# Patient Record
Sex: MALE | Race: WHITE | Employment: FULL TIME | URBAN - METROPOLITAN AREA
[De-identification: names, ages, dates, MRNs, and addresses within clinical notes are randomized per-mention and may not be internally consistent; named-entity substitution may affect disease eponyms.]

---

## 2019-02-11 ENCOUNTER — APPOINTMENT (OUTPATIENT)
Dept: CT IMAGING | Facility: CLINIC | Age: 54
End: 2019-02-11
Payer: COMMERCIAL

## 2019-02-11 ENCOUNTER — HOSPITAL ENCOUNTER (EMERGENCY)
Facility: CLINIC | Age: 54
Discharge: HOME | End: 2019-02-11
Attending: EMERGENCY MEDICINE
Payer: COMMERCIAL

## 2019-02-11 VITALS
HEART RATE: 73 BPM | TEMPERATURE: 98 F | RESPIRATION RATE: 17 BRPM | BODY MASS INDEX: 22.46 KG/M2 | HEIGHT: 74 IN | OXYGEN SATURATION: 99 % | SYSTOLIC BLOOD PRESSURE: 175 MMHG | DIASTOLIC BLOOD PRESSURE: 120 MMHG | WEIGHT: 175 LBS

## 2019-02-11 DIAGNOSIS — Z76.0 ENCOUNTER FOR MEDICATION REFILL: ICD-10-CM

## 2019-02-11 DIAGNOSIS — K11.5 SIALOLITHIASIS OF SUBMANDIBULAR GLAND: Primary | ICD-10-CM

## 2019-02-11 LAB
ABSOLUTE EOS #: 0.2 K/UL (ref 0–0.4)
ABSOLUTE IMMATURE GRANULOCYTE: ABNORMAL K/UL (ref 0–0.3)
ABSOLUTE LYMPH #: 2.3 K/UL (ref 1–4.8)
ABSOLUTE MONO #: 0.8 K/UL (ref 0.1–1.2)
ALBUMIN SERPL-MCNC: 4.2 G/DL (ref 3.5–5.2)
ALBUMIN/GLOBULIN RATIO: 1.4 (ref 1–2.5)
ALP BLD-CCNC: 89 U/L (ref 40–129)
ALT SERPL-CCNC: 162 U/L (ref 5–41)
ANION GAP SERPL CALCULATED.3IONS-SCNC: 11 MMOL/L (ref 9–17)
AST SERPL-CCNC: 117 U/L
BASOPHILS # BLD: 1 % (ref 0–2)
BASOPHILS ABSOLUTE: 0.1 K/UL (ref 0–0.2)
BILIRUB SERPL-MCNC: 1.2 MG/DL (ref 0.3–1.2)
BUN BLDV-MCNC: 20 MG/DL (ref 6–20)
BUN/CREAT BLD: ABNORMAL (ref 9–20)
CALCIUM SERPL-MCNC: 9.6 MG/DL (ref 8.6–10.4)
CHLORIDE BLD-SCNC: 104 MMOL/L (ref 98–107)
CO2: 25 MMOL/L (ref 20–31)
CREAT SERPL-MCNC: 1.1 MG/DL (ref 0.7–1.2)
DIFFERENTIAL TYPE: ABNORMAL
EOSINOPHILS RELATIVE PERCENT: 2 % (ref 1–4)
GFR AFRICAN AMERICAN: >60 ML/MIN
GFR NON-AFRICAN AMERICAN: >60 ML/MIN
GFR SERPL CREATININE-BSD FRML MDRD: ABNORMAL ML/MIN/{1.73_M2}
GFR SERPL CREATININE-BSD FRML MDRD: ABNORMAL ML/MIN/{1.73_M2}
GLUCOSE BLD-MCNC: 122 MG/DL (ref 70–99)
HCT VFR BLD CALC: 44 % (ref 41–53)
HEMOGLOBIN: 14.5 G/DL (ref 13.5–17.5)
IMMATURE GRANULOCYTES: ABNORMAL %
LYMPHOCYTES # BLD: 23 % (ref 24–44)
MCH RBC QN AUTO: 31.1 PG (ref 26–34)
MCHC RBC AUTO-ENTMCNC: 32.9 G/DL (ref 31–37)
MCV RBC AUTO: 94.6 FL (ref 80–100)
MONOCYTES # BLD: 8 % (ref 2–11)
NRBC AUTOMATED: ABNORMAL PER 100 WBC
PDW BLD-RTO: 15.4 % (ref 12.5–15.4)
PLATELET # BLD: 292 K/UL (ref 140–450)
PLATELET ESTIMATE: ABNORMAL
PMV BLD AUTO: 7.5 FL (ref 6–12)
POTASSIUM SERPL-SCNC: 5.2 MMOL/L (ref 3.7–5.3)
RBC # BLD: 4.65 M/UL (ref 4.5–5.9)
RBC # BLD: ABNORMAL 10*6/UL
SEG NEUTROPHILS: 66 % (ref 36–66)
SEGMENTED NEUTROPHILS ABSOLUTE COUNT: 6.6 K/UL (ref 1.8–7.7)
SODIUM BLD-SCNC: 140 MMOL/L (ref 135–144)
TOTAL PROTEIN: 7.3 G/DL (ref 6.4–8.3)
WBC # BLD: 9.9 K/UL (ref 3.5–11)
WBC # BLD: ABNORMAL 10*3/UL

## 2019-02-11 PROCEDURE — 2580000003 HC RX 258: Performed by: EMERGENCY MEDICINE

## 2019-02-11 PROCEDURE — 85025 COMPLETE CBC W/AUTO DIFF WBC: CPT

## 2019-02-11 PROCEDURE — 96374 THER/PROPH/DIAG INJ IV PUSH: CPT

## 2019-02-11 PROCEDURE — 70491 CT SOFT TISSUE NECK W/DYE: CPT

## 2019-02-11 PROCEDURE — 99284 EMERGENCY DEPT VISIT MOD MDM: CPT

## 2019-02-11 PROCEDURE — 6360000002 HC RX W HCPCS: Performed by: EMERGENCY MEDICINE

## 2019-02-11 PROCEDURE — 36415 COLL VENOUS BLD VENIPUNCTURE: CPT

## 2019-02-11 PROCEDURE — 80053 COMPREHEN METABOLIC PANEL: CPT

## 2019-02-11 PROCEDURE — 6360000004 HC RX CONTRAST MEDICATION: Performed by: EMERGENCY MEDICINE

## 2019-02-11 RX ORDER — LISINOPRIL 10 MG/1
10 TABLET ORAL DAILY
Qty: 30 TABLET | Refills: 0 | Status: SHIPPED | OUTPATIENT
Start: 2019-02-11 | End: 2019-04-04

## 2019-02-11 RX ORDER — 0.9 % SODIUM CHLORIDE 0.9 %
80 INTRAVENOUS SOLUTION INTRAVENOUS ONCE
Status: DISCONTINUED | OUTPATIENT
Start: 2019-02-11 | End: 2019-02-11 | Stop reason: HOSPADM

## 2019-02-11 RX ORDER — KETOROLAC TROMETHAMINE 15 MG/ML
15 INJECTION, SOLUTION INTRAMUSCULAR; INTRAVENOUS ONCE
Status: COMPLETED | OUTPATIENT
Start: 2019-02-11 | End: 2019-02-11

## 2019-02-11 RX ORDER — MORPHINE SULFATE 4 MG/ML
4 INJECTION, SOLUTION INTRAMUSCULAR; INTRAVENOUS ONCE
Status: DISCONTINUED | OUTPATIENT
Start: 2019-02-11 | End: 2019-02-11

## 2019-02-11 RX ORDER — SODIUM CHLORIDE 0.9 % (FLUSH) 0.9 %
10 SYRINGE (ML) INJECTION 2 TIMES DAILY
Status: DISCONTINUED | OUTPATIENT
Start: 2019-02-11 | End: 2019-02-11 | Stop reason: HOSPADM

## 2019-02-11 RX ORDER — PANTOPRAZOLE SODIUM 40 MG/1
40 TABLET, DELAYED RELEASE ORAL DAILY
COMMUNITY

## 2019-02-11 RX ORDER — 0.9 % SODIUM CHLORIDE 0.9 %
500 INTRAVENOUS SOLUTION INTRAVENOUS ONCE
Status: COMPLETED | OUTPATIENT
Start: 2019-02-11 | End: 2019-02-11

## 2019-02-11 RX ADMIN — Medication 10 ML: at 16:09

## 2019-02-11 RX ADMIN — SODIUM CHLORIDE 500 ML: 9 INJECTION, SOLUTION INTRAVENOUS at 15:15

## 2019-02-11 RX ADMIN — KETOROLAC TROMETHAMINE 15 MG: 15 INJECTION, SOLUTION INTRAMUSCULAR; INTRAVENOUS at 16:22

## 2019-02-11 RX ADMIN — IOVERSOL 70 ML: 741 INJECTION INTRA-ARTERIAL; INTRAVENOUS at 16:08

## 2019-02-11 ASSESSMENT — PAIN DESCRIPTION - PAIN TYPE
TYPE: ACUTE PAIN

## 2019-02-11 ASSESSMENT — PAIN DESCRIPTION - FREQUENCY
FREQUENCY: CONTINUOUS
FREQUENCY: CONTINUOUS

## 2019-02-11 ASSESSMENT — PAIN SCALES - GENERAL
PAINLEVEL_OUTOF10: 6
PAINLEVEL_OUTOF10: 6
PAINLEVEL_OUTOF10: 8
PAINLEVEL_OUTOF10: 7

## 2019-02-11 ASSESSMENT — PAIN DESCRIPTION - DESCRIPTORS: DESCRIPTORS: SHARP

## 2019-02-11 ASSESSMENT — PAIN DESCRIPTION - ORIENTATION: ORIENTATION: RIGHT;LEFT

## 2019-02-11 ASSESSMENT — PAIN DESCRIPTION - LOCATION
LOCATION: THROAT
LOCATION: THROAT;NECK

## 2019-02-11 ASSESSMENT — PAIN - FUNCTIONAL ASSESSMENT: PAIN_FUNCTIONAL_ASSESSMENT: 0-10

## 2019-04-04 ENCOUNTER — HOSPITAL ENCOUNTER (INPATIENT)
Age: 54
LOS: 1 days | Discharge: HOME | DRG: 918 | End: 2019-04-04
Attending: EMERGENCY MEDICINE | Admitting: INTERNAL MEDICINE
Payer: COMMERCIAL

## 2019-04-04 VITALS
BODY MASS INDEX: 22.46 KG/M2 | WEIGHT: 175 LBS | RESPIRATION RATE: 13 BRPM | HEART RATE: 79 BPM | TEMPERATURE: 98.1 F | HEIGHT: 74 IN | DIASTOLIC BLOOD PRESSURE: 75 MMHG | SYSTOLIC BLOOD PRESSURE: 131 MMHG | OXYGEN SATURATION: 92 %

## 2019-04-04 DIAGNOSIS — T40.601A OPIATE OVERDOSE, ACCIDENTAL OR UNINTENTIONAL, INITIAL ENCOUNTER (HCC): Primary | ICD-10-CM

## 2019-04-04 PROBLEM — Z72.0 TOBACCO ABUSE: Status: ACTIVE | Noted: 2019-04-04

## 2019-04-04 PROBLEM — R73.9 HYPERGLYCEMIA: Status: ACTIVE | Noted: 2019-04-04

## 2019-04-04 PROBLEM — E87.6 HYPOKALEMIA: Status: ACTIVE | Noted: 2019-04-04

## 2019-04-04 PROBLEM — I10 UNCONTROLLED HYPERTENSION: Status: ACTIVE | Noted: 2019-04-04

## 2019-04-04 LAB
ABSOLUTE EOS #: 0.2 K/UL (ref 0–0.4)
ABSOLUTE IMMATURE GRANULOCYTE: ABNORMAL K/UL (ref 0–0.3)
ABSOLUTE LYMPH #: 2.1 K/UL (ref 1–4.8)
ABSOLUTE MONO #: 0.9 K/UL (ref 0.1–1.2)
ANION GAP SERPL CALCULATED.3IONS-SCNC: 15 MMOL/L (ref 9–17)
BASOPHILS # BLD: 1 % (ref 0–2)
BASOPHILS ABSOLUTE: 0.1 K/UL (ref 0–0.2)
BUN BLDV-MCNC: 18 MG/DL (ref 6–20)
BUN/CREAT BLD: ABNORMAL (ref 9–20)
CALCIUM SERPL-MCNC: 8.9 MG/DL (ref 8.6–10.4)
CHLORIDE BLD-SCNC: 104 MMOL/L (ref 98–107)
CO2: 20 MMOL/L (ref 20–31)
CREAT SERPL-MCNC: 1.2 MG/DL (ref 0.7–1.2)
DIFFERENTIAL TYPE: ABNORMAL
EOSINOPHILS RELATIVE PERCENT: 3 % (ref 1–4)
ESTIMATED AVERAGE GLUCOSE: 97 MG/DL
GFR AFRICAN AMERICAN: >60 ML/MIN
GFR NON-AFRICAN AMERICAN: >60 ML/MIN
GFR SERPL CREATININE-BSD FRML MDRD: ABNORMAL ML/MIN/{1.73_M2}
GFR SERPL CREATININE-BSD FRML MDRD: ABNORMAL ML/MIN/{1.73_M2}
GLUCOSE BLD-MCNC: 165 MG/DL (ref 70–99)
HBA1C MFR BLD: 5 % (ref 4–6)
HCT VFR BLD CALC: 40.6 % (ref 41–53)
HEMOGLOBIN: 13.6 G/DL (ref 13.5–17.5)
IMMATURE GRANULOCYTES: ABNORMAL %
LYMPHOCYTES # BLD: 26 % (ref 24–44)
MCH RBC QN AUTO: 32.4 PG (ref 26–34)
MCHC RBC AUTO-ENTMCNC: 33.5 G/DL (ref 31–37)
MCV RBC AUTO: 96.9 FL (ref 80–100)
MONOCYTES # BLD: 11 % (ref 2–11)
NRBC AUTOMATED: ABNORMAL PER 100 WBC
PDW BLD-RTO: 14.6 % (ref 12.5–15.4)
PLATELET # BLD: 249 K/UL (ref 140–450)
PLATELET ESTIMATE: ABNORMAL
PMV BLD AUTO: 7.8 FL (ref 6–12)
POTASSIUM SERPL-SCNC: 3.6 MMOL/L (ref 3.7–5.3)
RBC # BLD: 4.19 M/UL (ref 4.5–5.9)
RBC # BLD: ABNORMAL 10*6/UL
SEG NEUTROPHILS: 59 % (ref 36–66)
SEGMENTED NEUTROPHILS ABSOLUTE COUNT: 4.8 K/UL (ref 1.8–7.7)
SODIUM BLD-SCNC: 139 MMOL/L (ref 135–144)
TROPONIN INTERP: NORMAL
TROPONIN T: NORMAL NG/ML
TROPONIN, HIGH SENSITIVITY: 9 NG/L (ref 0–22)
WBC # BLD: 8.1 K/UL (ref 3.5–11)
WBC # BLD: ABNORMAL 10*3/UL

## 2019-04-04 PROCEDURE — 6360000002 HC RX W HCPCS: Performed by: INTERNAL MEDICINE

## 2019-04-04 PROCEDURE — 96374 THER/PROPH/DIAG INJ IV PUSH: CPT

## 2019-04-04 PROCEDURE — 2060000000 HC ICU INTERMEDIATE R&B

## 2019-04-04 PROCEDURE — 93005 ELECTROCARDIOGRAM TRACING: CPT

## 2019-04-04 PROCEDURE — 94761 N-INVAS EAR/PLS OXIMETRY MLT: CPT

## 2019-04-04 PROCEDURE — 80048 BASIC METABOLIC PNL TOTAL CA: CPT

## 2019-04-04 PROCEDURE — 99223 1ST HOSP IP/OBS HIGH 75: CPT | Performed by: INTERNAL MEDICINE

## 2019-04-04 PROCEDURE — 6360000002 HC RX W HCPCS: Performed by: EMERGENCY MEDICINE

## 2019-04-04 PROCEDURE — 2580000003 HC RX 258: Performed by: INTERNAL MEDICINE

## 2019-04-04 PROCEDURE — 2700000000 HC OXYGEN THERAPY PER DAY

## 2019-04-04 PROCEDURE — 36415 COLL VENOUS BLD VENIPUNCTURE: CPT

## 2019-04-04 PROCEDURE — 84484 ASSAY OF TROPONIN QUANT: CPT

## 2019-04-04 PROCEDURE — 85025 COMPLETE CBC W/AUTO DIFF WBC: CPT

## 2019-04-04 PROCEDURE — 83036 HEMOGLOBIN GLYCOSYLATED A1C: CPT

## 2019-04-04 PROCEDURE — 99285 EMERGENCY DEPT VISIT HI MDM: CPT

## 2019-04-04 RX ORDER — SODIUM CHLORIDE 9 MG/ML
INJECTION, SOLUTION INTRAVENOUS CONTINUOUS
Status: DISCONTINUED | OUTPATIENT
Start: 2019-04-04 | End: 2019-04-04 | Stop reason: HOSPADM

## 2019-04-04 RX ORDER — SODIUM CHLORIDE 0.9 % (FLUSH) 0.9 %
10 SYRINGE (ML) INJECTION PRN
Status: DISCONTINUED | OUTPATIENT
Start: 2019-04-04 | End: 2019-04-04 | Stop reason: HOSPADM

## 2019-04-04 RX ORDER — NALOXONE HYDROCHLORIDE 0.4 MG/ML
0.4 INJECTION, SOLUTION INTRAMUSCULAR; INTRAVENOUS; SUBCUTANEOUS PRN
Status: DISCONTINUED | OUTPATIENT
Start: 2019-04-04 | End: 2019-04-04 | Stop reason: HOSPADM

## 2019-04-04 RX ORDER — SODIUM CHLORIDE 0.9 % (FLUSH) 0.9 %
10 SYRINGE (ML) INJECTION EVERY 12 HOURS SCHEDULED
Status: DISCONTINUED | OUTPATIENT
Start: 2019-04-04 | End: 2019-04-04 | Stop reason: HOSPADM

## 2019-04-04 RX ORDER — LISINOPRIL 10 MG/1
10 TABLET ORAL DAILY
Status: DISCONTINUED | OUTPATIENT
Start: 2019-04-05 | End: 2019-04-04 | Stop reason: HOSPADM

## 2019-04-04 RX ORDER — POTASSIUM CHLORIDE 20 MEQ/1
40 TABLET, EXTENDED RELEASE ORAL PRN
Status: DISCONTINUED | OUTPATIENT
Start: 2019-04-04 | End: 2019-04-04 | Stop reason: HOSPADM

## 2019-04-04 RX ORDER — NICOTINE 21 MG/24HR
1 PATCH, TRANSDERMAL 24 HOURS TRANSDERMAL DAILY PRN
Status: DISCONTINUED | OUTPATIENT
Start: 2019-04-04 | End: 2019-04-04 | Stop reason: HOSPADM

## 2019-04-04 RX ORDER — ACETAMINOPHEN 325 MG/1
650 TABLET ORAL EVERY 4 HOURS PRN
Status: DISCONTINUED | OUTPATIENT
Start: 2019-04-04 | End: 2019-04-04 | Stop reason: HOSPADM

## 2019-04-04 RX ORDER — ONDANSETRON 2 MG/ML
4 INJECTION INTRAMUSCULAR; INTRAVENOUS EVERY 6 HOURS PRN
Status: DISCONTINUED | OUTPATIENT
Start: 2019-04-04 | End: 2019-04-04 | Stop reason: ALTCHOICE

## 2019-04-04 RX ORDER — ONDANSETRON 2 MG/ML
4 INJECTION INTRAMUSCULAR; INTRAVENOUS EVERY 6 HOURS PRN
Status: DISCONTINUED | OUTPATIENT
Start: 2019-04-04 | End: 2019-04-04 | Stop reason: HOSPADM

## 2019-04-04 RX ORDER — PANTOPRAZOLE SODIUM 40 MG/1
40 TABLET, DELAYED RELEASE ORAL DAILY
Status: DISCONTINUED | OUTPATIENT
Start: 2019-04-04 | End: 2019-04-04 | Stop reason: HOSPADM

## 2019-04-04 RX ORDER — ONDANSETRON 4 MG/1
4 TABLET, ORALLY DISINTEGRATING ORAL EVERY 6 HOURS PRN
Status: DISCONTINUED | OUTPATIENT
Start: 2019-04-04 | End: 2019-04-04 | Stop reason: HOSPADM

## 2019-04-04 RX ORDER — MAGNESIUM SULFATE 1 G/100ML
1 INJECTION INTRAVENOUS PRN
Status: DISCONTINUED | OUTPATIENT
Start: 2019-04-04 | End: 2019-04-04 | Stop reason: HOSPADM

## 2019-04-04 RX ORDER — HYDRALAZINE HYDROCHLORIDE 20 MG/ML
10 INJECTION INTRAMUSCULAR; INTRAVENOUS EVERY 6 HOURS PRN
Status: DISCONTINUED | OUTPATIENT
Start: 2019-04-04 | End: 2019-04-04 | Stop reason: HOSPADM

## 2019-04-04 RX ORDER — NALOXONE HYDROCHLORIDE 1 MG/ML
2 INJECTION INTRAMUSCULAR; INTRAVENOUS; SUBCUTANEOUS ONCE
Status: COMPLETED | OUTPATIENT
Start: 2019-04-04 | End: 2019-04-04

## 2019-04-04 RX ORDER — POTASSIUM CHLORIDE 7.45 MG/ML
10 INJECTION INTRAVENOUS PRN
Status: DISCONTINUED | OUTPATIENT
Start: 2019-04-04 | End: 2019-04-04 | Stop reason: HOSPADM

## 2019-04-04 RX ADMIN — SODIUM CHLORIDE: 9 INJECTION, SOLUTION INTRAVENOUS at 11:55

## 2019-04-04 RX ADMIN — HYDRALAZINE HYDROCHLORIDE 10 MG: 20 INJECTION INTRAMUSCULAR; INTRAVENOUS at 11:09

## 2019-04-04 RX ADMIN — NALOXONE HYDROCHLORIDE 2 MG: 1 INJECTION PARENTERAL at 08:07

## 2019-04-04 ASSESSMENT — PAIN SCALES - GENERAL: PAINLEVEL_OUTOF10: 0

## 2019-04-04 NOTE — ED NOTES
Pt placed on continuous cardiac and pulse oximetry monitoring.           Juvencio Munoz RN  04/04/19 8955

## 2019-04-04 NOTE — DISCHARGE SUMMARY
[facility]    Discharge Summary     Patient ID: Boo Johnson  :  1965   MRN: [id number]     ACCOUNT:  [de-identified]   Patient's PCP: No primary care provider on file. Admit Date: 2019   Discharge Date: 2019     Length of Stay: 0  Code Status:  Full Code  Admitting Physician: Miladis Babb DO  Discharge Physician: Miladis Babb DO     Active Discharge Diagnoses:     Hospital Problem Lists:  Principal Problem:    Narcotic overdose [facility])  Active Problems:    Uncontrolled hypertension    Hyperglycemia    Tobacco abuse    Hypokalemia  Resolved Problems:    * No resolved hospital problems. *      Admission Condition:  fair     Discharged Condition: stable    Hospital Stay:     Hospital Course:  Boo Johnson is a 47 y.o. male who was admitted for the management of   Narcotic overdose [facility]) , presented to ER with Drug Overdose    This is a 67-year-old white male who became unresponsive after taking what he believes was a Percocet obtained on the street. He has used Percocet in the past and believes that he was given fentanyl instead. Also he became unresponsive and was taking the emergency room. His unresponsiveness improved with Narcan and he required repeat doses to maintain consciousness and also was admitted the hospital for further monitoring. He was given additional dose of Narcan and slept most the day. At this point he is now awake and alert and back to his baseline self and will be discharged home.       Significant therapeutic interventions: As above    Significant Diagnostic Studies:   Labs / Micro:  CBC:   Lab Results   Component Value Date    WBC 8.1 2019    RBC 4.19 2019    HGB 13.6 2019    HCT 40.6 2019    MCV 96.9 2019    MCH 32.4 2019    MCHC 33.5 2019    RDW 14.6 2019     2019     BMP:    Lab Results   Component Value Date    GLUCOSE 165 2019  04/04/2019    K 3.6 04/04/2019     04/04/2019    CO2 20 04/04/2019    ANIONGAP 15 04/04/2019    BUN 18 04/04/2019    CREATININE 1.20 04/04/2019    BUNCRER NOT REPORTED 04/04/2019    CALCIUM 8.9 04/04/2019    LABGLOM >60 04/04/2019    GFRAA >60 04/04/2019    GFR      04/04/2019    GFR NOT REPORTED 04/04/2019          Radiology:  No results found. Consultations:    Consults:     Final Specialist Recommendations/Findings:   None      The patient was seen and examined on day of discharge and this discharge summary is in conjunction with any daily progress note from day of discharge. Discharge plan:     Disposition: Home    Physician Follow Up:   No follow-up provider specified. Requiring Further Evaluation/Follow Up POST HOSPITALIZATION/Incidental Findings: None    Diet: cardiac diet    Activity: As tolerated    Instructions to Patient: Take medications as prescribed    Discharge Medications:      Medication List      CHANGE how you take these medications    lisinopril 10 MG tablet  Commonly known as:  PRINIVIL;ZESTRIL  What changed:  Another medication with the same name was removed. Continue taking this medication, and follow the directions you see here. CONTINUE taking these medications    PROTONIX 40 MG tablet  Generic drug:  pantoprazole            Time Spent on discharge is  24 minutes in patient examination, evaluation, counseling as well as medication reconciliation, prescriptions for required medications, discharge plan and follow up. Electronically signed by   Shiv Wyman DO  4/4/2019  4:52 PM      Thank you Dr. Trina Naranjo primary care provider on file. for the opportunity to be involved in this patient's care.

## 2019-04-04 NOTE — ED PROVIDER NOTES
and time. He appears well-developed and well-nourished. HENT:   Head: Normocephalic and atraumatic. Mouth/Throat: Oropharynx is clear and moist.   Eyes: Pupils are equal, round, and reactive to light. Conjunctivae and EOM are normal.   Pupils are pinpoint   Neck: Normal range of motion. Neck supple. No tracheal deviation present. Cardiovascular: Normal rate, regular rhythm and intact distal pulses. Pulmonary/Chest: Effort normal and breath sounds normal.   Abdominal: Soft. Bowel sounds are normal. He exhibits no distension. There is no tenderness. Musculoskeletal: Normal range of motion. He exhibits no edema or tenderness. Neurological: He is alert and oriented to person, place, and time. Skin: Skin is warm and dry. No rash noted. Psychiatric: He has a normal mood and affect. His behavior is normal. Judgment and thought content normal.   Vitals reviewed. DIFFERENTIAL DIAGNOSIS/ MDM:   Patient has been placed on pulse ox and continuous cardiac monitor. He continues to fall sleep and requiring some oxygen here. DIAGNOSTIC RESULTS     EKG:  EKG as interpreted by myself as showing a sinus rhythm with prolonged QT interval but no acute ST segment changes. Axis and intervals are otherwise normal.    RADIOLOGY:   No results found.       LABS:  Results for orders placed or performed during the hospital encounter of 04/04/19   CBC Auto Differential   Result Value Ref Range    WBC 8.1 3.5 - 11.0 k/uL    RBC 4.19 (L) 4.5 - 5.9 m/uL    Hemoglobin 13.6 13.5 - 17.5 g/dL    Hematocrit 40.6 (L) 41 - 53 %    MCV 96.9 80 - 100 fL    MCH 32.4 26 - 34 pg    MCHC 33.5 31 - 37 g/dL    RDW 14.6 12.5 - 15.4 %    Platelets 531 790 - 624 k/uL    MPV 7.8 6.0 - 12.0 fL    NRBC Automated NOT REPORTED per 100 WBC    Differential Type NOT REPORTED     Seg Neutrophils 59 36 - 66 %    Lymphocytes 26 24 - 44 %    Monocytes 11 2 - 11 %    Eosinophils % 3 1 - 4 %    Basophils 1 0 - 2 %    Immature Granulocytes NOT REPORTED 0 %    Segs Absolute 4.80 1.8 - 7.7 k/uL    Absolute Lymph # 2.10 1.0 - 4.8 k/uL    Absolute Mono # 0.90 0.1 - 1.2 k/uL    Absolute Eos # 0.20 0.0 - 0.4 k/uL    Basophils # 0.10 0.0 - 0.2 k/uL    Absolute Immature Granulocyte NOT REPORTED 0.00 - 0.30 k/uL    WBC Morphology NOT REPORTED     RBC Morphology NOT REPORTED     Platelet Estimate NOT REPORTED    Basic Metabolic Panel w/ Reflex to MG   Result Value Ref Range    Glucose 165 (H) 70 - 99 mg/dL    BUN 18 6 - 20 mg/dL    CREATININE 1.20 0.70 - 1.20 mg/dL    Bun/Cre Ratio NOT REPORTED 9 - 20    Calcium 8.9 8.6 - 10.4 mg/dL    Sodium 139 135 - 144 mmol/L    Potassium 3.6 (L) 3.7 - 5.3 mmol/L    Chloride 104 98 - 107 mmol/L    CO2 20 20 - 31 mmol/L    Anion Gap 15 9 - 17 mmol/L    GFR Non-African American >60 >60 mL/min    GFR African American >60 >60 mL/min    GFR Comment          GFR Staging NOT REPORTED    Troponin   Result Value Ref Range    Troponin, High Sensitivity 9 0 - 22 ng/L    Troponin T NOT REPORTED <0.03 ng/mL    Troponin Interp NOT REPORTED    EKG 12 Lead   Result Value Ref Range    Ventricular Rate 75 BPM    Atrial Rate 75 BPM    P-R Interval 164 ms    QRS Duration 108 ms    Q-T Interval 440 ms    QTc Calculation (Bazett) 491 ms    P Axis 47 degrees    R Axis 20 degrees    T Axis 37 degrees       EMERGENCY DEPARTMENT COURSE:     Thepatient was given the following medications:  Orders Placed This Encounter   Medications    naloxone (NARCAN) injection 2 mg        Vitals:    Vitals:    04/04/19 0757 04/04/19 0804 04/04/19 0812 04/04/19 0833   BP: (!) 169/118 (!) 165/111 (!) 183/122 (!) 156/104   Pulse: 80 77 82 80   Resp: 13 11 14 14   Temp: 98.7 °F (37.1 °C)      TempSrc: Oral      SpO2: 91% 90% 96% 91%   Weight: 79.4 kg (175 lb)      Height: 6' 2\" (1.88 m)        -------------------------  BP: (!) 156/104, Temp: 98.7 °F (37.1 °C), Pulse: 80, Resp: 14      Re-evaluation Notes  Patient continues to require supplemental oxygen.   He's been given an additional dose of Narcan which is minimally transiently worked. With him continuing to require supplemental oxygen as well as Narcan, I do feel he requires admission. I have discussed this patient with Dr. Skye Jorge, hospitalist at [address] [place] [address]. [facility] was kind enough to accept this patient. CONSULTS:  None    CRITICAL CARE:   None    PROCEDURES:  None    FINAL IMPRESSION      1. Opiate overdose, accidental or unintentional, initial encounter [facility])          DISPOSITION/PLAN   DISPOSITION Admitted 04/04/2019 08:59:12 AM      Condition on Disposition  fair    PATIENT REFERRED TO:  No follow-up provider specified.     DISCHARGE MEDICATIONS:  [unfilled]    (Please note that portions of this note were completed with a voice recognitionprogram.  Efforts were made to edit the dictations but occasionally words are mis-transcribed.)    Jami Donato MD, F.A.C.E.P, F.A.A.E.M  Emergency Physician Attending          Jami Donato MD  04/04/19 5926

## 2019-04-04 NOTE — H&P
[facility]    HISTORY AND PHYSICAL EXAMINATION            Date:   4/4/2019  Patient name:  Mario Renteria  Date of admission:  4/4/2019  7:57 AM  MRN:   [id number]  Account:  [de-identified]  YOB: 1965  PCP:    No primary care provider on file. Room:   [address] [place] [address]  Code Status:    Full Code    Chief Complaint:     Chief Complaint   Patient presents with    Drug Overdose       History Obtained From:     patient    History of Present Illness: The patient is a 47 y.o. Non-/non  male who presents with Drug Overdose   and he is admitted to the hospital for the management of  her chronic overdose. This is a 64-year-old white male who was taking the emergency room this morning due to unresponsiveness. He apparently had purchased street drugs which he thought was Percocet and shortly after taking the drug he became unresponsive. He believes he was given fentanyl and not Percocet as reasonably undesired response. He had given given several doses of Narcan in the emergency room with transient improvement in his mentation but he would relapse as the drug would wear off. He subsequently admitted for further monitoring and repeat Narcan doses as needed. He is currently seen in his room is more awake. Denies any complains of chest pain, shortness of breath, nausea or vomiting. He does remain drowsy however. Past Medical History:     Past Medical History:   Diagnosis Date    Hypertension     MI (mitral incompetence)     Pancreatitis         Past Surgical History:     Past Surgical History:   Procedure Laterality Date    CARDIAC SURGERY      CHOLECYSTECTOMY          Medications Prior to Admission:     Prior to Admission medications    Medication Sig Start Date End Date Taking?  Authorizing Provider   pantoprazole (PROTONIX) 40 MG tablet Take 40 mg by mouth daily   Yes Historical Provider, MD   lisinopril needed  5. Tobacco cessation  6. Supplement potassium  7. Check hemoglobin A1c to the hyperglycemia  8. Insulin scale  9. Cardiac diet as tolerated  10. GI and DVT prophylaxis  11. See orders for details    Consultations:   None     Patient is admitted as inpatient status because of co-morbidities listed above, severity of signs and symptoms as outlined, requirement for current medical therapies and most importantly because of direct risk to patient if care not provided in a hospital setting. Shiv Wyman DO  4/4/2019  12:12 PM    Copy sent to Dr. Muniz primary care provider on file.

## 2019-04-04 NOTE — ED NOTES
Dr. Yunier Henderson speaking on the phone with Dr. Lynda Isbell for admission to [facility]".      Karina Carpenter RN  04/04/19 3459

## 2019-04-04 NOTE — PROGRESS NOTES
Pt A&O x4, vital signs within normal limits, SpO2 98% on RA, RR 16, pt states he is ready to go home.  Will update Dr. Carolina Urbina
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
## 2019-04-05 LAB
EKG ATRIAL RATE: 75 BPM
EKG P AXIS: 47 DEGREES
EKG P-R INTERVAL: 164 MS
EKG Q-T INTERVAL: 440 MS
EKG QRS DURATION: 108 MS
EKG QTC CALCULATION (BAZETT): 491 MS
EKG R AXIS: 20 DEGREES
EKG T AXIS: 37 DEGREES
EKG VENTRICULAR RATE: 75 BPM

## 2020-07-21 ENCOUNTER — HOSPITAL ENCOUNTER (EMERGENCY)
Facility: CLINIC | Age: 55
Discharge: HOME | End: 2020-07-21
Attending: EMERGENCY MEDICINE
Payer: COMMERCIAL

## 2020-07-21 VITALS
HEIGHT: 73 IN | HEART RATE: 71 BPM | SYSTOLIC BLOOD PRESSURE: 173 MMHG | DIASTOLIC BLOOD PRESSURE: 96 MMHG | TEMPERATURE: 99.2 F | BODY MASS INDEX: 22.47 KG/M2 | RESPIRATION RATE: 15 BRPM | OXYGEN SATURATION: 94 % | WEIGHT: 169.53 LBS

## 2020-07-21 PROCEDURE — 6370000000 HC RX 637 (ALT 250 FOR IP): Performed by: EMERGENCY MEDICINE

## 2020-07-21 PROCEDURE — 99283 EMERGENCY DEPT VISIT LOW MDM: CPT

## 2020-07-21 RX ORDER — AMOXICILLIN AND CLAVULANATE POTASSIUM 875; 125 MG/1; MG/1
1 TABLET, FILM COATED ORAL 2 TIMES DAILY
Qty: 10 TABLET | Refills: 0 | Status: SHIPPED | OUTPATIENT
Start: 2020-07-21 | End: 2020-07-26

## 2020-07-21 RX ORDER — AMOXICILLIN AND CLAVULANATE POTASSIUM 875; 125 MG/1; MG/1
1 TABLET, FILM COATED ORAL ONCE
Status: COMPLETED | OUTPATIENT
Start: 2020-07-21 | End: 2020-07-21

## 2020-07-21 RX ADMIN — AMOXICILLIN AND CLAVULANATE POTASSIUM 1 TABLET: 875; 125 TABLET, FILM COATED ORAL at 22:41
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
## 2020-07-22 NOTE — ED NOTES
Tube gauze placed right thumb, pt tolerated well, pms intact before and after application     Chadwick Breen RN  07/21/20 9706
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
## 2021-01-12 ENCOUNTER — HOSPITAL ENCOUNTER (EMERGENCY)
Facility: CLINIC | Age: 56
Discharge: HOME | End: 2021-01-12
Attending: EMERGENCY MEDICINE
Payer: COMMERCIAL

## 2021-01-12 VITALS
OXYGEN SATURATION: 98 % | SYSTOLIC BLOOD PRESSURE: 195 MMHG | HEART RATE: 74 BPM | BODY MASS INDEX: 26.51 KG/M2 | RESPIRATION RATE: 16 BRPM | HEIGHT: 73 IN | WEIGHT: 200 LBS | DIASTOLIC BLOOD PRESSURE: 115 MMHG | TEMPERATURE: 97.9 F

## 2021-01-12 DIAGNOSIS — J45.21 MILD INTERMITTENT ASTHMA WITH EXACERBATION: Primary | ICD-10-CM

## 2021-01-12 PROCEDURE — 99282 EMERGENCY DEPT VISIT SF MDM: CPT

## 2021-01-12 RX ORDER — ALBUTEROL SULFATE 90 UG/1
2 AEROSOL, METERED RESPIRATORY (INHALATION) EVERY 4 HOURS PRN
Qty: 1 INHALER | Refills: 0 | Status: SHIPPED | OUTPATIENT
Start: 2021-01-12

## 2021-01-12 NOTE — ED PROVIDER NOTES
[address] [facility]      Pt Name: Azul Firtz  MRN: [id number]  [place] 1965  Date of evaluation: 1/12/2021      CHIEF COMPLAINT       Chief Complaint   Patient presents with    Shortness of Breath     States he has asthma and left AMA yesterday from [facility] for the same problem         HISTORY OF PRESENT ILLNESS      The patient states that he wants to get asthma inhaler. He says he was at [facility] yesterday and left AMA. He did not get a prescription. He does have a history of heart disease but is not having any chest pain. He says that he just wants to get a prescription for an inhaler. He denies cough or fever. Nothing makes her symptoms better or worse otherwise. REVIEW OF SYSTEMS       All systems reviewed and negative unless noted in HPI. The patient denies fever or constitutional symptoms. Denies vision change. Denies any sore throat or rhinorrhea. Denies any neck pain or stiffness. Denies chest pain. Reports shortness of breath and cough occasionally productive of yellow sputum. No nausea,  vomiting or diarrhea. Denies any dysuria. Denies urinary frequency or hematuria. Denies musculoskeletal injury or pain. Denies any weakness, numbness or focal neurologic deficit. Denies any skin rash or edema. No recent psychiatric issues. No easy bruising or bleeding. Denies any polyuria, polydypsia or history of immunocompromise. PAST MEDICAL HISTORY    has a past medical history of Hep C w/o coma, chronic ([facility] [address] Hypertension, MI (mitral incompetence), and Pancreatitis. SURGICAL HISTORY      has a past surgical history that includes Cholecystectomy and Cardiac surgery.     CURRENT MEDICATIONS       Previous Medications    LISINOPRIL (PRINIVIL;ZESTRIL) 10 MG TABLET    Take 10 mg by mouth daily    PANTOPRAZOLE (PROTONIX) 40 MG TABLET    Take 40 mg by mouth daily       ALLERGIES     is allergic to codeine and morphine. FAMILY HISTORY     He indicated that his mother is alive. He indicated that his father is . family history includes Other in his father. SOCIAL HISTORY      reports that he has been smoking cigarettes. He has been smoking about 1.00 pack per day. He has never used smokeless tobacco. He reports current drug use. Drug: Marijuana. He reports that he does not drink alcohol. PHYSICAL EXAM     INITIAL VITALS:  height is 6' 1\" (1.854 m) and weight is 90.7 kg (200 lb). His oral temperature is 97.9 °F (36.6 °C). His blood pressure is 195/115 (abnormal) and his pulse is 74. His respiration is 16 and oxygen saturation is 98%. The patient is alert and oriented, in no apparent distress. HEENT is atraumatic. Pupils are PERRL at 4 mm with normal extraocular motion. Mucous membranes moist.    Neck is supple with no lymphadenopathy. Heart sounds regular rate and rhythm with no gallops, murmurs, or rubs. Lungs clear, no wheezes, rales or rhonchi. Abdomen: soft, nontender with no pain to palpation. Musculoskeletal exam shows no evidence of trauma. Normal distal pulses in all extremities. Skin: no rash or edema. Neurological exam reveals cranial nerves 2 through 12 grossly intact. Patient has equal  and normal deep tendon reflexes. Psychiatric: no hallucinations or suicidal ideation. Lymphatics.:  No lymphadenopathy. DIFFERENTIAL DIAGNOSIS/ MDM:     Asthma exacerbation, ACS, pneumoniia    DIAGNOSTIC RESULTS       EMERGENCY DEPARTMENT COURSE:   Vitals:    Vitals:    21 1744   BP: (!) 195/115   Pulse: 74   Resp: 16   Temp: 97.9 °F (36.6 °C)   TempSrc: Oral   SpO2: 98%   Weight: 90.7 kg (200 lb)   Height: 6' 1\" (1.854 m)     -------------------------  BP: (!) 195/115, Temp: 97.9 °F (36.6 °C), Pulse: 74, Resp: 16      Re-evaluation Notes    The patient knows that he needs to take his medicine for his blood pressure. I written for albuterol.   The patient is discharged in good condition. FINAL IMPRESSION      1. Mild intermittent asthma with exacerbation          DISPOSITION/PLAN   DISPOSITION Decision To Discharge 01/12/2021 05:54:43 PM      Condition on Disposition    good    PATIENT REFERRED TO:  your doctor    In 1 week        DISCHARGE MEDICATIONS:  New Prescriptions    ALBUTEROL SULFATE HFA (PROVENTIL HFA) 108 (90 BASE) MCG/ACT INHALER    Inhale 2 puffs into the lungs every 4 hours as needed for Wheezing or Shortness of Breath (Space out to every 6 hours as symptoms improve) Space out to every 6 hours as symptoms improve.        (Please note that portions of this note were completed with a voice recognition program.  Efforts were made to edit the dictations but occasionally words are mis-transcribed.)    Pérez MD   Attending Emergency Physician       Norma Powell MD  01/12/21 5986

## 2021-07-11 ENCOUNTER — HOSPITAL ENCOUNTER (INPATIENT)
Age: 56
LOS: 1 days | Discharge: LEFT BEFORE DISCHARGE | DRG: 291 | End: 2021-07-13
Attending: EMERGENCY MEDICINE | Admitting: INTERNAL MEDICINE
Payer: COMMERCIAL

## 2021-07-11 ENCOUNTER — APPOINTMENT (OUTPATIENT)
Dept: GENERAL RADIOLOGY | Age: 56
DRG: 291 | End: 2021-07-11
Payer: COMMERCIAL

## 2021-07-11 DIAGNOSIS — I21.4 NSTEMI (NON-ST ELEVATED MYOCARDIAL INFARCTION) (HCC): ICD-10-CM

## 2021-07-11 DIAGNOSIS — I50.33 ACUTE ON CHRONIC DIASTOLIC CONGESTIVE HEART FAILURE (HCC): Primary | ICD-10-CM

## 2021-07-11 LAB
ABSOLUTE EOS #: 0.17 K/UL (ref 0–0.44)
ABSOLUTE IMMATURE GRANULOCYTE: 0.03 K/UL (ref 0–0.3)
ABSOLUTE LYMPH #: 2.08 K/UL (ref 1.1–3.7)
ABSOLUTE MONO #: 0.72 K/UL (ref 0.1–1.2)
ALBUMIN SERPL-MCNC: 3.9 G/DL (ref 3.5–5.2)
ALBUMIN/GLOBULIN RATIO: 1 (ref 1–2.5)
ALP BLD-CCNC: 102 U/L (ref 40–129)
ALT SERPL-CCNC: 19 U/L (ref 5–41)
ANION GAP SERPL CALCULATED.3IONS-SCNC: 13 MMOL/L (ref 9–17)
AST SERPL-CCNC: 46 U/L
BASOPHILS # BLD: 0 % (ref 0–2)
BASOPHILS ABSOLUTE: <0.03 K/UL (ref 0–0.2)
BILIRUB SERPL-MCNC: 0.68 MG/DL (ref 0.3–1.2)
BUN BLDV-MCNC: 19 MG/DL (ref 6–20)
BUN/CREAT BLD: ABNORMAL (ref 9–20)
CALCIUM SERPL-MCNC: 8.6 MG/DL (ref 8.6–10.4)
CHLORIDE BLD-SCNC: 104 MMOL/L (ref 98–107)
CO2: 20 MMOL/L (ref 20–31)
CREAT SERPL-MCNC: 1.62 MG/DL (ref 0.7–1.2)
DIFFERENTIAL TYPE: ABNORMAL
EOSINOPHILS RELATIVE PERCENT: 2 % (ref 1–4)
GFR AFRICAN AMERICAN: 54 ML/MIN
GFR NON-AFRICAN AMERICAN: 44 ML/MIN
GFR SERPL CREATININE-BSD FRML MDRD: ABNORMAL ML/MIN/{1.73_M2}
GFR SERPL CREATININE-BSD FRML MDRD: ABNORMAL ML/MIN/{1.73_M2}
GLUCOSE BLD-MCNC: 61 MG/DL (ref 70–99)
HCT VFR BLD CALC: 37.8 % (ref 40.7–50.3)
HEMOGLOBIN: 10.9 G/DL (ref 13–17)
IMMATURE GRANULOCYTES: 0 %
LYMPHOCYTES # BLD: 26 % (ref 24–43)
MCH RBC QN AUTO: 23.9 PG (ref 25.2–33.5)
MCHC RBC AUTO-ENTMCNC: 28.8 G/DL (ref 28.4–34.8)
MCV RBC AUTO: 82.7 FL (ref 82.6–102.9)
MONOCYTES # BLD: 9 % (ref 3–12)
NRBC AUTOMATED: 0 PER 100 WBC
PDW BLD-RTO: 17.2 % (ref 11.8–14.4)
PLATELET # BLD: 295 K/UL (ref 138–453)
PLATELET ESTIMATE: ABNORMAL
PMV BLD AUTO: 10 FL (ref 8.1–13.5)
POTASSIUM SERPL-SCNC: 5.6 MMOL/L (ref 3.7–5.3)
RBC # BLD: 4.57 M/UL (ref 4.21–5.77)
RBC # BLD: ABNORMAL 10*6/UL
SEG NEUTROPHILS: 63 % (ref 36–65)
SEGMENTED NEUTROPHILS ABSOLUTE COUNT: 5.13 K/UL (ref 1.5–8.1)
SODIUM BLD-SCNC: 137 MMOL/L (ref 135–144)
TOTAL PROTEIN: 7.7 G/DL (ref 6.4–8.3)
TROPONIN INTERP: ABNORMAL
TROPONIN T: ABNORMAL NG/ML
TROPONIN, HIGH SENSITIVITY: 65 NG/L (ref 0–22)
WBC # BLD: 8.2 K/UL (ref 3.5–11.3)
WBC # BLD: ABNORMAL 10*3/UL

## 2021-07-11 PROCEDURE — 71046 X-RAY EXAM CHEST 2 VIEWS: CPT

## 2021-07-11 PROCEDURE — 80053 COMPREHEN METABOLIC PANEL: CPT

## 2021-07-11 PROCEDURE — 99283 EMERGENCY DEPT VISIT LOW MDM: CPT

## 2021-07-11 PROCEDURE — 93005 ELECTROCARDIOGRAM TRACING: CPT | Performed by: HEALTH CARE PROVIDER

## 2021-07-11 PROCEDURE — 85025 COMPLETE CBC W/AUTO DIFF WBC: CPT

## 2021-07-11 PROCEDURE — 83880 ASSAY OF NATRIURETIC PEPTIDE: CPT

## 2021-07-11 PROCEDURE — 84484 ASSAY OF TROPONIN QUANT: CPT

## 2021-07-11 ASSESSMENT — PAIN DESCRIPTION - ORIENTATION: ORIENTATION: LEFT

## 2021-07-11 ASSESSMENT — PAIN DESCRIPTION - LOCATION: LOCATION: RIB CAGE

## 2021-07-11 ASSESSMENT — PAIN DESCRIPTION - PAIN TYPE: TYPE: ACUTE PAIN

## 2021-07-11 ASSESSMENT — PAIN DESCRIPTION - FREQUENCY: FREQUENCY: CONTINUOUS

## 2021-07-11 ASSESSMENT — PAIN SCALES - GENERAL: PAINLEVEL_OUTOF10: 6

## 2021-07-11 ASSESSMENT — PAIN DESCRIPTION - DESCRIPTORS: DESCRIPTORS: ACHING

## 2021-07-12 ENCOUNTER — APPOINTMENT (OUTPATIENT)
Dept: GENERAL RADIOLOGY | Age: 56
DRG: 291 | End: 2021-07-12
Payer: COMMERCIAL

## 2021-07-12 PROBLEM — N18.9 CKD (CHRONIC KIDNEY DISEASE): Status: ACTIVE | Noted: 2020-10-03

## 2021-07-12 PROBLEM — Z87.19 HISTORY OF CHRONIC PANCREATITIS: Status: ACTIVE | Noted: 2017-11-21

## 2021-07-12 PROBLEM — I50.23 ACUTE ON CHRONIC SYSTOLIC CONGESTIVE HEART FAILURE (HCC): Status: ACTIVE | Noted: 2017-12-31

## 2021-07-12 PROBLEM — I10 ESSENTIAL HYPERTENSION: Status: ACTIVE | Noted: 2017-12-31

## 2021-07-12 PROBLEM — F19.20 SUBSTANCE ADDICTION (HCC): Status: ACTIVE | Noted: 2017-09-10

## 2021-07-12 PROBLEM — N18.31 STAGE 3A CHRONIC KIDNEY DISEASE (HCC): Status: ACTIVE | Noted: 2020-10-03

## 2021-07-12 PROBLEM — I50.9 DECOMPENSATED HEART FAILURE (HCC): Status: ACTIVE | Noted: 2021-07-12

## 2021-07-12 PROBLEM — R07.81 RIB PAIN: Status: ACTIVE | Noted: 2021-07-12

## 2021-07-12 LAB
ANION GAP SERPL CALCULATED.3IONS-SCNC: 11 MMOL/L (ref 9–17)
BNP INTERPRETATION: ABNORMAL
BUN BLDV-MCNC: 20 MG/DL (ref 6–20)
BUN/CREAT BLD: ABNORMAL (ref 9–20)
CALCIUM SERPL-MCNC: 7.9 MG/DL (ref 8.6–10.4)
CHLORIDE BLD-SCNC: 108 MMOL/L (ref 98–107)
CO2: 21 MMOL/L (ref 20–31)
CREAT SERPL-MCNC: 1.55 MG/DL (ref 0.7–1.2)
EKG ATRIAL RATE: 105 BPM
EKG P AXIS: 33 DEGREES
EKG P-R INTERVAL: 142 MS
EKG Q-T INTERVAL: 360 MS
EKG QRS DURATION: 98 MS
EKG QTC CALCULATION (BAZETT): 475 MS
EKG R AXIS: -6 DEGREES
EKG T AXIS: 156 DEGREES
EKG VENTRICULAR RATE: 105 BPM
GFR AFRICAN AMERICAN: 57 ML/MIN
GFR NON-AFRICAN AMERICAN: 47 ML/MIN
GFR SERPL CREATININE-BSD FRML MDRD: ABNORMAL ML/MIN/{1.73_M2}
GFR SERPL CREATININE-BSD FRML MDRD: ABNORMAL ML/MIN/{1.73_M2}
GLUCOSE BLD-MCNC: 127 MG/DL (ref 70–99)
IRON SATURATION: 7 % (ref 20–55)
IRON: 25 UG/DL (ref 59–158)
MYOGLOBIN: 24 NG/ML (ref 28–72)
POTASSIUM SERPL-SCNC: 4.3 MMOL/L (ref 3.7–5.3)
PRO-BNP: ABNORMAL PG/ML
SODIUM BLD-SCNC: 140 MMOL/L (ref 135–144)
TOTAL IRON BINDING CAPACITY: 343 UG/DL (ref 250–450)
TROPONIN INTERP: ABNORMAL
TROPONIN T: ABNORMAL NG/ML
TROPONIN, HIGH SENSITIVITY: 57 NG/L (ref 0–22)
UNSATURATED IRON BINDING CAPACITY: 318 UG/DL (ref 112–347)

## 2021-07-12 PROCEDURE — 6370000000 HC RX 637 (ALT 250 FOR IP): Performed by: HEALTH CARE PROVIDER

## 2021-07-12 PROCEDURE — 6370000000 HC RX 637 (ALT 250 FOR IP): Performed by: NURSE PRACTITIONER

## 2021-07-12 PROCEDURE — 2500000003 HC RX 250 WO HCPCS: Performed by: STUDENT IN AN ORGANIZED HEALTH CARE EDUCATION/TRAINING PROGRAM

## 2021-07-12 PROCEDURE — 83874 ASSAY OF MYOGLOBIN: CPT

## 2021-07-12 PROCEDURE — 2060000000 HC ICU INTERMEDIATE R&B

## 2021-07-12 PROCEDURE — 93005 ELECTROCARDIOGRAM TRACING: CPT | Performed by: EMERGENCY MEDICINE

## 2021-07-12 PROCEDURE — APPSS60 APP SPLIT SHARED TIME 46-60 MINUTES: Performed by: NURSE PRACTITIONER

## 2021-07-12 PROCEDURE — 6360000002 HC RX W HCPCS: Performed by: NURSE PRACTITIONER

## 2021-07-12 PROCEDURE — 83540 ASSAY OF IRON: CPT

## 2021-07-12 PROCEDURE — 2580000003 HC RX 258: Performed by: NURSE PRACTITIONER

## 2021-07-12 PROCEDURE — 99223 1ST HOSP IP/OBS HIGH 75: CPT | Performed by: INTERNAL MEDICINE

## 2021-07-12 PROCEDURE — 6370000000 HC RX 637 (ALT 250 FOR IP): Performed by: STUDENT IN AN ORGANIZED HEALTH CARE EDUCATION/TRAINING PROGRAM

## 2021-07-12 PROCEDURE — 6370000000 HC RX 637 (ALT 250 FOR IP): Performed by: INTERNAL MEDICINE

## 2021-07-12 PROCEDURE — 80048 BASIC METABOLIC PNL TOTAL CA: CPT

## 2021-07-12 PROCEDURE — 84484 ASSAY OF TROPONIN QUANT: CPT

## 2021-07-12 PROCEDURE — 93010 ELECTROCARDIOGRAM REPORT: CPT | Performed by: INTERNAL MEDICINE

## 2021-07-12 PROCEDURE — 2500000003 HC RX 250 WO HCPCS: Performed by: HEALTH CARE PROVIDER

## 2021-07-12 PROCEDURE — 93005 ELECTROCARDIOGRAM TRACING: CPT | Performed by: INTERNAL MEDICINE

## 2021-07-12 PROCEDURE — 83550 IRON BINDING TEST: CPT

## 2021-07-12 RX ORDER — CARVEDILOL 12.5 MG/1
12.5 TABLET ORAL 2 TIMES DAILY WITH MEALS
Status: DISCONTINUED | OUTPATIENT
Start: 2021-07-13 | End: 2021-07-12

## 2021-07-12 RX ORDER — CARVEDILOL 3.12 MG/1
3.12 TABLET ORAL 2 TIMES DAILY WITH MEALS
Status: DISCONTINUED | OUTPATIENT
Start: 2021-07-13 | End: 2021-07-12

## 2021-07-12 RX ORDER — SODIUM CHLORIDE 9 MG/ML
25 INJECTION, SOLUTION INTRAVENOUS PRN
Status: DISCONTINUED | OUTPATIENT
Start: 2021-07-12 | End: 2021-07-13 | Stop reason: HOSPADM

## 2021-07-12 RX ORDER — NITROGLYCERIN 20 MG/100ML
5-200 INJECTION INTRAVENOUS CONTINUOUS
Status: DISCONTINUED | OUTPATIENT
Start: 2021-07-12 | End: 2021-07-13 | Stop reason: HOSPADM

## 2021-07-12 RX ORDER — FUROSEMIDE 10 MG/ML
40 INJECTION INTRAMUSCULAR; INTRAVENOUS 2 TIMES DAILY
Status: DISCONTINUED | OUTPATIENT
Start: 2021-07-12 | End: 2021-07-13

## 2021-07-12 RX ORDER — KETOROLAC TROMETHAMINE 15 MG/ML
15 INJECTION, SOLUTION INTRAMUSCULAR; INTRAVENOUS EVERY 6 HOURS PRN
Status: DISCONTINUED | OUTPATIENT
Start: 2021-07-12 | End: 2021-07-13 | Stop reason: HOSPADM

## 2021-07-12 RX ORDER — SODIUM CHLORIDE 0.9 % (FLUSH) 0.9 %
5-40 SYRINGE (ML) INJECTION EVERY 12 HOURS SCHEDULED
Status: DISCONTINUED | OUTPATIENT
Start: 2021-07-12 | End: 2021-07-13 | Stop reason: HOSPADM

## 2021-07-12 RX ORDER — ONDANSETRON 4 MG/1
4 TABLET, ORALLY DISINTEGRATING ORAL EVERY 8 HOURS PRN
Status: DISCONTINUED | OUTPATIENT
Start: 2021-07-12 | End: 2021-07-13 | Stop reason: HOSPADM

## 2021-07-12 RX ORDER — AMLODIPINE BESYLATE 10 MG/1
10 TABLET ORAL DAILY
Status: DISCONTINUED | OUTPATIENT
Start: 2021-07-12 | End: 2021-07-12

## 2021-07-12 RX ORDER — LISINOPRIL 10 MG/1
10 TABLET ORAL DAILY
Status: DISCONTINUED | OUTPATIENT
Start: 2021-07-12 | End: 2021-07-13 | Stop reason: HOSPADM

## 2021-07-12 RX ORDER — LORAZEPAM 0.5 MG/1
0.5 TABLET ORAL EVERY 4 HOURS PRN
Status: DISCONTINUED | OUTPATIENT
Start: 2021-07-12 | End: 2021-07-13 | Stop reason: HOSPADM

## 2021-07-12 RX ORDER — ACETAMINOPHEN 325 MG/1
650 TABLET ORAL EVERY 6 HOURS PRN
Status: DISCONTINUED | OUTPATIENT
Start: 2021-07-12 | End: 2021-07-13 | Stop reason: HOSPADM

## 2021-07-12 RX ORDER — CARVEDILOL 12.5 MG/1
12.5 TABLET ORAL 2 TIMES DAILY WITH MEALS
Status: DISCONTINUED | OUTPATIENT
Start: 2021-07-12 | End: 2021-07-13

## 2021-07-12 RX ORDER — ASPIRIN 81 MG/1
324 TABLET, CHEWABLE ORAL ONCE
Status: COMPLETED | OUTPATIENT
Start: 2021-07-12 | End: 2021-07-12

## 2021-07-12 RX ORDER — SODIUM CHLORIDE 0.9 % (FLUSH) 0.9 %
10 SYRINGE (ML) INJECTION PRN
Status: DISCONTINUED | OUTPATIENT
Start: 2021-07-12 | End: 2021-07-13 | Stop reason: HOSPADM

## 2021-07-12 RX ORDER — AMLODIPINE BESYLATE 10 MG/1
10 TABLET ORAL DAILY
Status: DISCONTINUED | OUTPATIENT
Start: 2021-07-12 | End: 2021-07-13 | Stop reason: HOSPADM

## 2021-07-12 RX ORDER — LORAZEPAM 0.5 MG/1
1 TABLET ORAL ONCE
Status: COMPLETED | OUTPATIENT
Start: 2021-07-12 | End: 2021-07-12

## 2021-07-12 RX ORDER — ASPIRIN 81 MG/1
81 TABLET ORAL DAILY
Status: DISCONTINUED | OUTPATIENT
Start: 2021-07-12 | End: 2021-07-13 | Stop reason: HOSPADM

## 2021-07-12 RX ORDER — NICOTINE 21 MG/24HR
1 PATCH, TRANSDERMAL 24 HOURS TRANSDERMAL DAILY
Status: DISCONTINUED | OUTPATIENT
Start: 2021-07-12 | End: 2021-07-13 | Stop reason: HOSPADM

## 2021-07-12 RX ORDER — ATORVASTATIN CALCIUM 40 MG/1
40 TABLET, FILM COATED ORAL NIGHTLY
Status: DISCONTINUED | OUTPATIENT
Start: 2021-07-12 | End: 2021-07-13 | Stop reason: HOSPADM

## 2021-07-12 RX ORDER — LIDOCAINE 4 G/G
2 PATCH TOPICAL DAILY
Status: DISCONTINUED | OUTPATIENT
Start: 2021-07-12 | End: 2021-07-13 | Stop reason: HOSPADM

## 2021-07-12 RX ORDER — ACETAMINOPHEN 650 MG/1
650 SUPPOSITORY RECTAL EVERY 6 HOURS PRN
Status: DISCONTINUED | OUTPATIENT
Start: 2021-07-12 | End: 2021-07-13 | Stop reason: HOSPADM

## 2021-07-12 RX ORDER — ONDANSETRON 2 MG/ML
4 INJECTION INTRAMUSCULAR; INTRAVENOUS EVERY 6 HOURS PRN
Status: DISCONTINUED | OUTPATIENT
Start: 2021-07-12 | End: 2021-07-13 | Stop reason: HOSPADM

## 2021-07-12 RX ORDER — POLYETHYLENE GLYCOL 3350 17 G/17G
17 POWDER, FOR SOLUTION ORAL DAILY PRN
Status: DISCONTINUED | OUTPATIENT
Start: 2021-07-12 | End: 2021-07-13 | Stop reason: HOSPADM

## 2021-07-12 RX ORDER — LABETALOL HYDROCHLORIDE 5 MG/ML
10 INJECTION, SOLUTION INTRAVENOUS EVERY 6 HOURS PRN
Status: DISCONTINUED | OUTPATIENT
Start: 2021-07-12 | End: 2021-07-13 | Stop reason: HOSPADM

## 2021-07-12 RX ORDER — PANTOPRAZOLE SODIUM 40 MG/1
40 TABLET, DELAYED RELEASE ORAL DAILY
Status: DISCONTINUED | OUTPATIENT
Start: 2021-07-12 | End: 2021-07-13 | Stop reason: HOSPADM

## 2021-07-12 RX ORDER — ONDANSETRON 4 MG/1
4 TABLET, ORALLY DISINTEGRATING ORAL ONCE
Status: COMPLETED | OUTPATIENT
Start: 2021-07-12 | End: 2021-07-12

## 2021-07-12 RX ADMIN — LORAZEPAM 1 MG: 0.5 TABLET ORAL at 00:54

## 2021-07-12 RX ADMIN — FUROSEMIDE 40 MG: 10 INJECTION, SOLUTION INTRAMUSCULAR; INTRAVENOUS at 11:21

## 2021-07-12 RX ADMIN — FUROSEMIDE 40 MG: 10 INJECTION, SOLUTION INTRAMUSCULAR; INTRAVENOUS at 18:00

## 2021-07-12 RX ADMIN — Medication 81 MG: at 11:20

## 2021-07-12 RX ADMIN — NITROGLYCERIN 5 MCG/MIN: 20 INJECTION INTRAVENOUS at 01:15

## 2021-07-12 RX ADMIN — ENOXAPARIN SODIUM 90 MG: 100 INJECTION SUBCUTANEOUS at 11:22

## 2021-07-12 RX ADMIN — ONDANSETRON 4 MG: 4 TABLET, ORALLY DISINTEGRATING ORAL at 00:54

## 2021-07-12 RX ADMIN — SODIUM CHLORIDE, PRESERVATIVE FREE 10 ML: 5 INJECTION INTRAVENOUS at 11:23

## 2021-07-12 RX ADMIN — AMLODIPINE BESYLATE 10 MG: 10 TABLET ORAL at 16:37

## 2021-07-12 RX ADMIN — ASPIRIN 324 MG: 81 TABLET, CHEWABLE ORAL at 00:54

## 2021-07-12 RX ADMIN — CARVEDILOL 12.5 MG: 12.5 TABLET, FILM COATED ORAL at 11:20

## 2021-07-12 RX ADMIN — PANTOPRAZOLE SODIUM 40 MG: 40 TABLET, DELAYED RELEASE ORAL at 11:21

## 2021-07-12 RX ADMIN — CARVEDILOL 12.5 MG: 12.5 TABLET, FILM COATED ORAL at 16:37

## 2021-07-12 RX ADMIN — Medication 10 MG: at 17:59

## 2021-07-12 RX ADMIN — LORAZEPAM 0.5 MG: 0.5 TABLET ORAL at 17:30

## 2021-07-12 ASSESSMENT — PAIN SCALES - GENERAL
PAINLEVEL_OUTOF10: 8
PAINLEVEL_OUTOF10: 0

## 2021-07-12 ASSESSMENT — ENCOUNTER SYMPTOMS
STRIDOR: 0
SHORTNESS OF BREATH: 1
VOMITING: 0
CHEST TIGHTNESS: 0
BACK PAIN: 0
COUGH: 0
ABDOMINAL PAIN: 0
CONSTIPATION: 0
EYE PAIN: 0
NAUSEA: 0
DIARRHEA: 0
WHEEZING: 0
BLOOD IN STOOL: 0
SORE THROAT: 0
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
## 2021-07-12 NOTE — CONSULTS
[facility] Cardiology Cardiology    Consult / H&P               Today's Date: 7/12/2021  Patient Name: Noé Angelo  Date of admission: 7/11/2021 10:00 PM  Patient's age: 55 y.o., 1965  Admission Dx: Decompensated heart failure ([facility] [address] [I50.9]    Reason for Consult:  Cardiac evaluation    Requesting Physician: Freeman Mondragon DO    CHIEF COMPLAINT: Shortness of breath and lower extremity edema    History Obtained From:  patient, electronic medical record    HISTORY OF PRESENT ILLNESS:      This patient 36-year-old male with past medical history of hypertension, chronic HFpEF, medication noncompliance, polysubstance abuse, hepatitis C, smoker, persistent pleural effusion on the right, history of type B aortic dissection s/p TEVAR with stents in the renal artery and SMA later developed type a aortic dissection with aortic regurgitation s/p repair at the [facility]. He was supposed to be on multiple medication including amlodipine, beta-blocker, lisinopril but he has not been taking any of them. This time patient was brought into the ER under police custody for the concern of ankle swelling and shortness of breath. Patient stated he has been suffering for months with this issue and felt like he needed evaluated. He also endorses orthopnea and PND   On arrival to the ER he was found to have blood pressure of 193/115, tachycardic with heart rate 108. Elevated proBNP at 64,000, troponin 65 later down trended to 57. [place] with creatinine of 1.55. Chest x-ray showed mild cardiomegaly with pulmonary vascular congestion. EKG showed normal sinus rhythm with premature atrial contraction. Past Medical History:   has a past medical history of Hep C w/o coma, chronic ([facility] [address] Hypertension, MI (mitral incompetence), and Pancreatitis. Past Surgical History:   has a past surgical history that includes Cholecystectomy and Cardiac surgery.      Home Medications:    Prior to Admission medications    Medication Sig Start Date End Date Taking? Authorizing Provider   albuterol sulfate HFA (PROVENTIL HFA) 108 (90 Base) MCG/ACT inhaler Inhale 2 puffs into the lungs every 4 hours as needed for Wheezing or Shortness of Breath (Space out to every 6 hours as symptoms improve) Space out to every 6 hours as symptoms improve. 1/12/21   Debo Gramajo MD   pantoprazole (PROTONIX) 40 MG tablet Take 40 mg by mouth daily    Historical Provider, MD   lisinopril (PRINIVIL;ZESTRIL) 10 MG tablet Take 10 mg by mouth daily    Historical Provider, MD     Allergies:  Codeine and Morphine    Social History:   reports that he has been smoking cigarettes. He has been smoking about 1.00 pack per day. He has never used smokeless tobacco. He reports current drug use. Drugs: Marijuana, Opiates , and Methamphetamines. He reports that he does not drink alcohol. Family History: family history includes Other in his father. REVIEW OF SYSTEMS:    · Constitutional: there has been no unanticipated weight loss. There's been No change in energy level, No change in activity level. · Eyes: No visual changes or diplopia. No scleral icterus. · ENT: No Headaches  · Cardiovascular: + for shortness of breath, orthopnea and PNd and leg swelling   · Respiratory: No previous pulmonary problems, No cough  · Gastrointestinal: No abdominal pain. No change in bowel or bladder habits. · Genitourinary: No dysuria, trouble voiding, or hematuria. · Musculoskeletal:  No gait disturbance, No weakness or joint complaints. · Integumentary: No rash or pruritis. · Neurological: No headache, diplopia, change in muscle strength, numbness or tingling. No change in gait, balance, coordination, mood, affect, memory, mentation, behavior. · Psychiatric: No anxiety, or depression. · Endocrine: No temperature intolerance. No excessive thirst, fluid intake, or urination. No tremor.   · Hematologic/Lymphatic: No abnormal bruising or bleeding, blood clots or swollen lymph nodes.  · Allergic/Immunologic: No nasal congestion or hives. PHYSICAL EXAM:      BP (!) 173/115   Pulse 96   Temp 99.1 °F (37.3 °C) (Oral)   Resp 14   Ht 6' 1\" (1.854 m)   Wt 200 lb (90.7 kg)   SpO2 97%   BMI 26.39 kg/m²    Constitutional and General Appearance: alert, cooperative, no distress and appears stated age  HEENT: PERRL, no cervical lymphadenopathy. No masses palpable. Normal oral mucosa  Respiratory:  · Basal inspiratory crackles   Cardiovascular:  · The apical impulse is not displaced  · Heart tones are crisp and normal. regular S1 and S2.  · Jugular venous pulsation Normal  · The carotid upstroke is normal in amplitude and contour without delay or bruit  · Peripheral pulses are symmetrical and full   Abdomen:   · No masses or tenderness  · Bowel sounds present  Extremities:  · B/L 2+ pitting edema   Neurological:  · Alert and oriented. · Moves all extremities well  · No abnormalities of mood, affect, memory, mentation, or behavior are noted    DATA:    Diagnostics:    ECHO:   Left Ventricle: Systolic function is low normal to mildly decreased   with an ejection fraction of 50-55%. Normal diastolic function is present. Lateral E' is 13.4 cm/s. Medial E' is 6.7 cm/s.   Aortic Valve: There is no regurgitation or stenosis.   Aorta: The aortic root is mildly dilated.   Pericardium: There is no pericardial effusion. There is a left pleural     Labs:   CBC:   Recent Labs     07/11/21 2306   WBC 8.2   HGB 10.9*   HCT 37.8*        BMP:   Recent Labs     07/11/21 2306 07/12/21  0341    140   K 5.6* 4.3   CO2 20 21   BUN 19 20   CREATININE 1.62* 1.55*   LABGLOM 44* 47*   GLUCOSE 61* 127*     LIVER PROFILE:  Recent Labs     07/11/21 2306   AST 46*   ALT 19   LABALBU 3.9       IMPRESSION:    1. Acute on chronic HFpEF, likely due to #5 & #6  2.  H/O type B aortic dissection s/p TEVAR with stents to renal artery and SMA, later developed type aortic dissection with AR s/p

## 2021-07-12 NOTE — ED PROVIDER NOTES
[facility]     Emergency Department     Faculty Attestation    I performed a history and physical examination of the patient and discussed management with the resident. I reviewed the residents note and agree with the documented findings and plan of care. Any areas of disagreement are noted on the chart. I was personally present for the key portions of any procedures. I have documented in the chart those procedures where I was not present during the key portions. I have reviewed the emergency nurses triage note. I agree with the chief complaint, past medical history, past surgical history, allergies, medications, social and family history as documented unless otherwise noted below. For Physician Assistant/ Nurse Practitioner cases/documentation I have personally evaluated this patient and have completed at least one if not all key elements of the E/M (history, physical exam, and MDM). Additional findings are as noted. I have personally seen and evaluated the patient. I find the patient's history and physical exam are consistent with the NP/PA documentation. I agree with the care provided, treatment rendered, disposition and follow-up plan. 60-year-old male with a history of ACS, CHF with a EF of 30%, cardiac arrest in June, admitted at [facility], recent admission to [facility] and left AMA presenting with swelling and shortness of breath. Patient is accompanied by senior living staff. No falls or injuries. States that he has been swelling in his legs. Per police, patient had overdosed at [facility] earlier today. Patient denies any subsequent heroin use.     Exam:  General: Laying on the bed, awake, alert and in no acute distress  CV: normal rate and regular rhythm  Lungs: Breathing comfortably on room air with no tachypnea, hypoxia, or increased work of breathing  Extremities: 2+ pitting edema to the midshin    Plan:  Cardiac work-up including CBC, electrolytes, troponin x2, BNP, chest x-ray    Troponin elevated to 65, no prior high-sensitivity troponin available. BNP 64,000, significantly higher than prior baseline (2,600). [place] present. We will admit patient for heart failure and NSTEMI. Cardiology consulted by resident, recommended holding Lovenox and/or heparin unless troponin is continuing to rise. EKG sent via [employer]. Patient signed out to overnight physician pending bed assignment.         Boston Castañeda MD   Attending Emergency  Physician    (Please note that portions of this note were completed with a voice recognition program. Efforts were made to edit the dictations but occasionally words are mis-transcribed.)              Boston Castañeda MD  07/12/21 [id number]

## 2021-07-12 NOTE — ED NOTES
ED to inpatient nurses report    Chief Complaint   Patient presents with    Drug Overdose     needs med cleared for nursing home      Present to ED from Home  LOC: alert and orientated to name, place, date  Vital signs   Vitals:    07/12/21 0700 07/12/21 0715 07/12/21 0730 07/12/21 0745   BP: (!) 190/103 (!) 172/102 (!) 172/105 (!) 173/115   Pulse: 97 92 91 96   Resp: 21 17 14 14   Temp:       TempSrc:       SpO2:       Weight:       Height:          Oxygen Baseline RA    Current needs required RA  LDAs:   Peripheral IV 04/04/19 Right Antecubital (Active)       Peripheral IV 07/12/21 External Jugular (Active)   Site Assessment Clean;Dry; Intact 07/12/21 0029   Line Status Blood return noted 07/12/21 0029   Dressing Status Clean;Dry; Intact 07/12/21 0029     Mobility: Independent  Pending ED orders: none  Present condition: Stable on nitro gtt  Code Status: FULL  Consults:  [x]  Hospitalist  Completed  [x] yes [] no  []  Medicine  Completed  [] yes [] No  [x]  Cardiology  Completed  [x] yes [] No  []  GI   Completed  [] yes [] No  []  Neurology  Completed  [] yes [] No  []  Nephrology Completed  [] yes [] No  []  Vascular  Completed  [] yes [] No   []  Surgery  Completed  [] yes [] No   []  Urology  Completed  [] yes [] No   []  Plastics  Completed  [] yes [] No   []  ENT  Completed  [] yes [] No   []  Other   Completed  [] yes [] No  Pertinent event(s) p ton Nitro gtt. Pt wanted to leave A. Dr. Shakir Stoner talked to pt and he is now agreeable to stay. Pt has police at bedside.    Pertinent event(s)   Electronically signed by Susan Gomez RN on 7/12/2021 at 7:54 AM     Susan Gomez RN  07/12/21 [id number]

## 2021-07-12 NOTE — H&P
[facility]  Office: [address], DO, Mohanmaceydeanna Alex, DO, Jose Licea, DO, Myke Velasquez Blood, DO, Calvin Villatoro MD, Roderick Szymanski MD, Bárbara Park MD, Dhruv Hazel MD, Jami Moncada MD, Catherine Ovalle MD, Yeni Collazo MD, Migue Talbot, DO, Bridget Carey MD, Kash Montes, DO, Dmitriy Riojas MD,  Carmen Vasquez DO, Bhumika Crowe MD, Markie Jackson MD, Genie Dakin, MD, Bia Steele MD, Meghan Levy MD, Deloris Valiente MD, Pop Cullen, [facility], [facility], CNP, Tisha Wesley, CNP, Sterling Fernandes, CNS, Samantha Guadalupe, CNP, Jens Alexandra, CNP, Tanesha Oliver, CNP, Jaciel Singh, CNP, Mariangel Reyes, CNP, Vu Rivera PA-C, Alexis Hernandez, [facility], Armani Ocampo, CNP, Jessica Benson, CNP, Dominique Harmon, CNP, Sharmila Hooper, CNP, Sae Madrid, CNP, Eliane Perez, CNP, Dara Meek, CNP, Halley Ellis, CNP         [address]    HISTORY AND PHYSICAL EXAMINATION            Date:   7/12/2021  Patient name:  Yonathan Redmond  Date of admission:  7/11/2021 10:00 PM  MRN:   [id number]  Account:  [de-identified]  YOB: 1965  PCP:    No primary care provider on file. Room:   47PED/PED  Code Status:    Prior    Chief Complaint:     Chief Complaint   Patient presents with    Drug Overdose     needs med cleared for [facility]       History Obtained From:     patient, electronic medical record    History of Present Illness:     Yonathan Redmond is a 55 y.o.  Non-/non  male diastolic chronic congestive heart failure, persistent pleural effusion on the right, history of aspiration pneumonia, chronic kidney disease stage 3, history of aortic dissection status post TAVR and endovascular graft placement, COPD, hepatitis-C, hypertension, extensive history of polysubstance abuse including heroin cocaine and opioids, tobacco abuse and medications noncompliance  who presents with  and is admitted to the hospital for the management of AonC exacerbation of systolic heart failure     Patient was brought in to the emergency room under police custody for the concern of ankle swelling and shortness of breath. Patient states he has been suffering for \" months\" with this issue and felt like he needed evaluated. He does not really elaborate on a time frame of acute worsening of the symptoms. Patient has had multiple admissions in which he has signed AMA. Patient admits to 20 drug overdose as well. Past Medical History:     Past Medical History:   Diagnosis Date    Hep C w/o coma, chronic (HCC)     Hypertension     MI (mitral incompetence)     Pancreatitis     diastolic chronic congestive heart failure, persistent pleural effusion on the right, history of aspiration pneumonia, chronic kidney disease stage 3, history of aortic dissection status post TAVR and endovascular graft placement, COPD, hepatitis-C, hypertension, extensive history of polysubstance abuse including heroin cocaine and opioids, tobacco abuse and medications noncompliance   Type B Aortic Dissection (s/p TEVAR ~ August - September 2020  Type A retrograde dissection involving aortic arch, occluded R carotid via retrograde dissection    Past Surgical History:     Past Surgical History:   Procedure Laterality Date    CARDIAC SURGERY      CHOLECYSTECTOMY      Type B Aortic Dissection (s/p TEVAR ~ August - September 2020  Type A retrograde dissection involving aortic arch, occluded R carotid via retrograde dissection s/p 10/2/2020: Ascending and hemiarch aortic replacement with aortic valve resuspension   Hernia repair  Medications Prior to Admission:     Prior to Admission medications    Medication Sig Start Date End Date Taking?  Authorizing Provider   albuterol sulfate HFA (PROVENTIL HFA) 108 (90 Base) MCG/ACT inhaler Inhale 2 puffs into the lungs every 4 hours as needed for Wheezing or Shortness of Breath (Space out to every 6 hours as symptoms improve) Space out to every 6 hours as symptoms improve. 21   Joanne Rincon MD   pantoprazole (PROTONIX) 40 MG tablet Take 40 mg by mouth daily    Historical Provider, MD   lisinopril (PRINIVIL;ZESTRIL) 10 MG tablet Take 10 mg by mouth daily    Historical Provider, MD        Allergies:     Codeine and Morphine    Social History:     Tobacco:    reports that he has been smoking cigarettes. He has been smoking about 1.00 pack per day. He has never used smokeless tobacco.  Alcohol:      reports no history of alcohol use. Drug Use:  reports current drug use. Drugs: Marijuana, Opiates , and Methamphetamines. Family History:     Family History   Problem Relation Age of Onset    Other Father         suicide       Review of Systems:     Positive and Negative as described in HPI. Review of Systems   Constitutional: Negative for chills, diaphoresis and fever. HENT: Negative for congestion and hearing loss. Respiratory: Positive for shortness of breath. Negative for cough, wheezing and stridor. Cardiovascular: Positive for chest pain and leg swelling. Negative for palpitations. Gastrointestinal: Negative for abdominal pain, blood in stool, constipation, diarrhea, nausea and vomiting. Genitourinary: Positive for scrotal swelling. Negative for dysuria and frequency. Musculoskeletal: Negative for myalgias. Skin: Negative for rash. Neurological: Negative for dizziness, seizures and headaches. Psychiatric/Behavioral: The patient is not nervous/anxious. Physical Exam:   BP (!) 191/115   Pulse 109   Temp 99.1 °F (37.3 °C) (Oral)   Resp 18   Ht 6' 1\" (1.854 m)   Wt 200 lb (90.7 kg)   SpO2 96%   BMI 26.39 kg/m²   Temp (24hrs), Av.1 °F (37.3 °C), Min:99.1 °F (37.3 °C), Max:99.1 °F (37.3 °C)    No results for input(s): POCGLU in the last 72 hours. No intake or output data in the 24 hours ending 21 0133    Physical Exam  Vitals and nursing note reviewed.    Constitutional:       General: He is not in acute distress. Appearance: He is well-developed. He is not ill-appearing or diaphoretic. HENT:      Head: Normocephalic and atraumatic. Right Ear: Hearing normal.      Left Ear: Hearing normal.      Nose: Nose normal. No rhinorrhea. Eyes:      General: Lids are normal.      Extraocular Movements:      Right eye: Normal extraocular motion. Left eye: Normal extraocular motion. Conjunctiva/sclera: Conjunctivae normal.      Right eye: Right conjunctiva is not injected. Left eye: Left conjunctiva is not injected. Pupils: Pupils are equal, round, and reactive to light. Pupils are equal.      Right eye: Pupil is reactive. Left eye: Pupil is reactive. Neck:      Thyroid: No thyromegaly. Vascular: No carotid bruit. Trachea: Trachea and phonation normal. No tracheal deviation. Cardiovascular:      Rate and Rhythm: Normal rate and regular rhythm. Pulses: Normal pulses. Heart sounds: Murmur heard. Pulmonary:      Effort: Pulmonary effort is normal. No respiratory distress. Breath sounds: No stridor. Examination of the right-middle field reveals rales. Examination of the left-middle field reveals rales. Examination of the right-lower field reveals rales. Examination of the left-lower field reveals rales. Rales present. No decreased breath sounds. Abdominal:      General: Bowel sounds are normal. There is no distension. Palpations: Abdomen is soft. There is no mass. Tenderness: There is no abdominal tenderness. There is no guarding. Musculoskeletal:         General: No tenderness. Cervical back: Neck supple. Right lower le+ Edema present. Left lower le+ Edema present. Skin:     General: Skin is warm and dry. Findings: No erythema, lesion or rash. Neurological:      Mental Status: He is alert and oriented to person, place, and time. He is not disoriented. GCS: GCS eye subscore is 4.  GCS verbal subscore is 5. GCS motor subscore is 6. Cranial Nerves: No cranial nerve deficit or dysarthria. Sensory: No sensory deficit. Psychiatric:         Speech: Speech normal.         Behavior: Behavior normal. Behavior is cooperative.          Investigations:      Laboratory Testing:  Recent Results (from the past 24 hour(s))   CBC Auto Differential    Collection Time: 07/11/21 11:06 PM   Result Value Ref Range    WBC 8.2 3.5 - 11.3 k/uL    RBC 4.57 4.21 - 5.77 m/uL    Hemoglobin 10.9 (L) 13.0 - 17.0 g/dL    Hematocrit 37.8 (L) 40.7 - 50.3 %    MCV 82.7 82.6 - 102.9 fL    MCH 23.9 (L) 25.2 - 33.5 pg    MCHC 28.8 28.4 - 34.8 g/dL    RDW 17.2 (H) 11.8 - 14.4 %    Platelets 828 407 - 792 k/uL    MPV 10.0 8.1 - 13.5 fL    NRBC Automated 0.0 0.0 per 100 WBC    Differential Type NOT REPORTED     Seg Neutrophils 63 36 - 65 %    Lymphocytes 26 24 - 43 %    Monocytes 9 3 - 12 %    Eosinophils % 2 1 - 4 %    Basophils 0 0 - 2 %    Immature Granulocytes 0 0 %    Segs Absolute 5.13 1.50 - 8.10 k/uL    Absolute Lymph # 2.08 1.10 - 3.70 k/uL    Absolute Mono # 0.72 0.10 - 1.20 k/uL    Absolute Eos # 0.17 0.00 - 0.44 k/uL    Basophils Absolute <0.03 0.00 - 0.20 k/uL    Absolute Immature Granulocyte 0.03 0.00 - 0.30 k/uL    WBC Morphology NOT REPORTED     RBC Morphology ANISOCYTOSIS PRESENT     Platelet Estimate NOT REPORTED    Comprehensive Metabolic Panel w/ Reflex to MG    Collection Time: 07/11/21 11:06 PM   Result Value Ref Range    Glucose 61 (L) 70 - 99 mg/dL    BUN 19 6 - 20 mg/dL    CREATININE 1.62 (H) 0.70 - 1.20 mg/dL    Bun/Cre Ratio NOT REPORTED 9 - 20    Calcium 8.6 8.6 - 10.4 mg/dL    Sodium 137 135 - 144 mmol/L    Potassium 5.6 (H) 3.7 - 5.3 mmol/L    Chloride 104 98 - 107 mmol/L    CO2 20 20 - 31 mmol/L    Anion Gap 13 9 - 17 mmol/L    Alkaline Phosphatase 102 40 - 129 U/L    ALT 19 5 - 41 U/L    AST 46 (H) <40 U/L    Total Bilirubin 0.68 0.3 - 1.2 mg/dL    Total Protein 7.7 6.4 - 8.3 g/dL    Albumin 3.9 3.5 - 5.2 g/dL Albumin/Globulin Ratio 1.0 1.0 - 2.5    GFR Non- 44 (L) >60 mL/min    GFR  54 (L) >60 mL/min    GFR Comment          GFR Staging NOT REPORTED    Troponin    Collection Time: 07/11/21 11:06 PM   Result Value Ref Range    Troponin, High Sensitivity 65 (HH) 0 - 22 ng/L    Troponin T NOT REPORTED <0.03 ng/mL    Troponin Interp NOT REPORTED    Brain Natriuretic Peptide    Collection Time: 07/11/21 11:06 PM   Result Value Ref Range    Pro-BNP 64,827 (H) <300 pg/mL    BNP Interpretation Pro-BNP Reference Range:        Imaging/Diagnostics:  XR CHEST (2 VW)    Result Date: 7/11/2021  Status post CABG surgery with mild cardiomegaly and mild central pulmonary congestion which is more prominent Mild bibasilar atelectasis or small infiltrates and small bibasilar pleural effusions posteriorly which is more prominent. Minimally displaced fracture of the 7th rib posterolaterally on the left. Assessment :      Hospital Problems         Last Modified POA    * (Principal) Acute on chronic systolic congestive heart failure ([address] 7/12/2021 Yes    Decompensated heart failure ([address] 7/12/2021 Yes    CKD (chronic kidney disease) 7/12/2021 Yes    Essential hypertension 7/12/2021 Yes    Substance addiction ([address] 7/12/2021 Yes    Rib pain on left side 7/12/2021 Yes          Plan:     Patient status inpatient in the Progressive Unit/Step down    1. IV diuresis, obtain echocardiogram, monitor I/o daily weight, on lisinopril, poly substance abuse, addition of bb at the decision of cardiology given the risk vs benefits. 2. Monitor renal function, avoid nephrotoxins, could be related secondary to the heart failure. 3. Monitor blood pressures, continue with lisinopril when potassium is normalized and kidney function has improved to baseline. Creat was normal in June at [facility]   4. Toradol and Lidoderm for the rib pain.    5. Patient states he is on suboxone, I do not see any fills of this medication on the [facility], I have even called pharmacy to verify this has not been dispensed, it is on the d/c summary of last hospitlal stay, but he signed AMA. Will  Need to get the name of the pharmacy  He filled it at and confirm  6. Continue to trend the troponin. Could be leaking secondary to the HF exacerbation and with the elevated kidney function   7. GI proph  8. DVT proph     Consultations:   IP CONSULT TO CARDIOLOGY  IP CONSULT TO INTERNAL MEDICINE  IP CONSULT TO HEART FAILURE NURSE/COORDINATOR  IP CONSULT TO DIETITIAN  IP CONSULT TO CARDIOLOGY     Patient is admitted as inpatient status because of co-morbidities listed above, severity of signs and symptoms as outlined, requirement for current medical therapies and most importantly because of direct risk to patient if care not provided in a hospital setting. Expected length of stay > 48 hours. MAMI Ko CNP  7/12/2021  1:33 AM    Copy sent to Dr. Muniz primary care provider on file.

## 2021-07-12 NOTE — PROGRESS NOTES
Spoke with [facility] detox; they did not supply patient with suboxone. Patient insists they did. They said they did not write a script at d/c either. I phone the pharmacy that patient said he gets it from; the last time they said they filled script was jan 31, 2021 for a 7 day supply.   Will forward info to physician

## 2021-07-12 NOTE — ED NOTES
Pt arrives to ED in police custody. Per pt he AMA from [facility] today. Pt was admitted to the ICU for heroin overdose. Pt had overdosed 3 times today at [facility] as well per police. Pt was picked up and taken to correction for warrants and the county sent him here for further eval. Pt states he has 30% EF. Pt denies any CP. Pt does have a known hernia. Pt has L sided rib pain. Pt states he wants to be treated.      Bg Montilla RN  07/11/21 2543

## 2021-07-12 NOTE — ED PROVIDER NOTES
[facility]  ED  Emergency Department Encounter  EmergencyMedicine Resident     Pt Name:Rio Bhatti  MRN: [id number]  Birthdate 1965  Date of evaluation: 7/11/21  PCP:  No primary care provider on file. This patient was evaluated in the Emergency Department for symptoms described in the history of present illness. The patient was evaluated in the context of the global COVID-19 pandemic, which necessitated consideration that the patient might be at risk for infection with the SARS-CoV-2 virus that causes COVID-19. Institutional protocols and algorithms that pertain to the evaluation of patients at risk for COVID-19 are in a state of rapid change based on information released by regulatory bodies including the CDC and federal and state organizations. These policies and algorithms were followed during the patient's care in the ED. CHIEF COMPLAINT       Chief Complaint   Patient presents with    Drug Overdose     needs med cleared for [facility]       HISTORY OF PRESENT ILLNESS  (Location/Symptom, Timing/Onset, Context/Setting, Quality, Duration, Modifying Factors, Severity.)      Anival Bustos is a 55 y.o. male who presents via police custody. He was admitted to the [facility] ICU after a heroin overdose last week but left AMA today as he needed to help his mom with something (note yet in system). On leaving the hospital he was picked up by police for an outstanding warrant. On intake to [facility] the nurse saw his recent history and directed him to here for re-eval.    Today he is concerned for ankle swelling, shortness of breath, and left rib pain (known displaced rib fx). He states his ankles will swell very badly when he gets more short of breath and the spironolactone will help get the fluid off. States he has 30% EF and they wanted to place a \"pacemaker\" in him.   He states he is due for his carvedilol, spironolactone, ativan, and suboxone tonight.  (Ativan and suboxone were new on file   Social History Narrative    Not on file     Social Determinants of Health     Financial Resource Strain:     Difficulty of Paying Living Expenses:    Food Insecurity:     Worried About Running Out of Food in the Last Year:     [address] in the Last Year:    Transportation Needs:     Lack of Transportation (Medical):  Lack of Transportation (Non-Medical):    Physical Activity:     Days of Exercise per Week:     Minutes of Exercise per Session:    Stress:     Feeling of Stress :    Social Connections:     Frequency of Communication with Friends and Family:     Frequency of Social Gatherings with Friends and Family:     Attends [employer] Services:     Active Member of Clubs or Organizations:     Attends Club or Organization Meetings:     Marital Status:    Intimate Partner Violence:     Fear of Current or Ex-Partner:     Emotionally Abused:     Physically Abused:     Sexually Abused:        Family History   Problem Relation Age of Onset    Other Father         suicide       Allergies:  Codeine and Morphine    Home Medications:  Prior to Admission medications    Medication Sig Start Date End Date Taking? Authorizing Provider   albuterol sulfate HFA (PROVENTIL HFA) 108 (90 Base) MCG/ACT inhaler Inhale 2 puffs into the lungs every 4 hours as needed for Wheezing or Shortness of Breath (Space out to every 6 hours as symptoms improve) Space out to every 6 hours as symptoms improve. 1/12/21   Curt Connell MD   pantoprazole (PROTONIX) 40 MG tablet Take 40 mg by mouth daily    Historical Provider, MD   lisinopril (PRINIVIL;ZESTRIL) 10 MG tablet Take 10 mg by mouth daily    Historical Provider, MD       REVIEW OF SYSTEMS    (2-9 systems for level 4, 10 or more for level 5)      Review of Systems   Constitutional: Negative for chills and fatigue. HENT: Negative for ear pain and sore throat. Eyes: Negative for pain. Respiratory: Positive for shortness of breath.  Negative for chest tightness. Cardiovascular: Positive for leg swelling. Negative for chest pain and palpitations. Gastrointestinal: Negative for abdominal pain and vomiting. Genitourinary: Negative for difficulty urinating and dysuria. Musculoskeletal: Negative for back pain. Neurological: Negative for dizziness and headaches. Psychiatric/Behavioral: Negative for agitation and confusion. PHYSICAL EXAM   (up to 7 for level 4, 8 or more for level 5)      INITIAL VITALS:   BP (!) 191/115   Pulse 109   Temp 99.1 °F (37.3 °C) (Oral)   Resp 18   Ht 6' 1\" (1.854 m)   Wt 200 lb (90.7 kg)   SpO2 96%   BMI 26.39 kg/m²     Physical Exam  Constitutional:       Appearance: He is ill-appearing. HENT:      Head: Normocephalic and atraumatic. Mouth/Throat:      Mouth: Mucous membranes are dry. Eyes:      Extraocular Movements: Extraocular movements intact. Pupils: Pupils are equal, round, and reactive to light. Cardiovascular:      Rate and Rhythm: Regular rhythm. Tachycardia present. Pulmonary:      Effort: Pulmonary effort is normal.   Chest:      Chest wall: Tenderness present. Abdominal:      Palpations: Abdomen is soft. Hernia: A hernia is present. Comments: Ventral hernia - lidoderm patch in place   Musculoskeletal:      Cervical back: Normal range of motion. Right lower leg: Edema present. Left lower leg: Edema present. Skin:     General: Skin is warm and dry. Capillary Refill: Capillary refill takes less than 2 seconds. Coloration: Skin is pale. Neurological:      General: No focal deficit present. Mental Status: He is alert and oriented to person, place, and time.    Psychiatric:         Mood and Affect: Mood normal.         Behavior: Behavior normal.       DIFFERENTIAL  DIAGNOSIS     PLAN (LABS / IMAGING / EKG):  Orders Placed This Encounter   Procedures    XR CHEST (2 VW)    CBC Auto Differential    Comprehensive Metabolic Panel w/ Reflex to MG    Troponin    Brain Natriuretic Peptide    Troponin    Inpatient consult to Cardiology    Inpatient consult to Internal Medicine    EKG 12 Lead    PATIENT STATUS (FROM ED OR OR/PROCEDURAL) Inpatient       MEDICATIONS ORDERED:  Orders Placed This Encounter   Medications    ondansetron (ZOFRAN-ODT) disintegrating tablet 4 mg    aspirin chewable tablet 324 mg    nitroGLYCERIN 50 mg in dextrose 5% 250 mL infusion    LORazepam (ATIVAN) tablet 1 mg       DDX:   - CHF exacerbation - elevated BNP and increased congestion and effusions on CXR, lower ext swelling  - COPD exacerbation - lungs clear  - PNA - CXR w/out focal findings other than increased congestion and effusions  - NSTEMI - elevated trop    DIAGNOSTIC RESULTS / EMERGENCY DEPARTMENT COURSE / MDM   LAB RESULTS:  Results for orders placed or performed during the hospital encounter of 07/11/21   CBC Auto Differential   Result Value Ref Range    WBC 8.2 3.5 - 11.3 k/uL    RBC 4.57 4.21 - 5.77 m/uL    Hemoglobin 10.9 (L) 13.0 - 17.0 g/dL    Hematocrit 37.8 (L) 40.7 - 50.3 %    MCV 82.7 82.6 - 102.9 fL    MCH 23.9 (L) 25.2 - 33.5 pg    MCHC 28.8 28.4 - 34.8 g/dL    RDW 17.2 (H) 11.8 - 14.4 %    Platelets 959 220 - 732 k/uL    MPV 10.0 8.1 - 13.5 fL    NRBC Automated 0.0 0.0 per 100 WBC    Differential Type NOT REPORTED     Seg Neutrophils 63 36 - 65 %    Lymphocytes 26 24 - 43 %    Monocytes 9 3 - 12 %    Eosinophils % 2 1 - 4 %    Basophils 0 0 - 2 %    Immature Granulocytes 0 0 %    Segs Absolute 5.13 1.50 - 8.10 k/uL    Absolute Lymph # 2.08 1.10 - 3.70 k/uL    Absolute Mono # 0.72 0.10 - 1.20 k/uL    Absolute Eos # 0.17 0.00 - 0.44 k/uL    Basophils Absolute <0.03 0.00 - 0.20 k/uL    Absolute Immature Granulocyte 0.03 0.00 - 0.30 k/uL    WBC Morphology NOT REPORTED     RBC Morphology ANISOCYTOSIS PRESENT     Platelet Estimate NOT REPORTED    Comprehensive Metabolic Panel w/ Reflex to MG   Result Value Ref Range    Glucose 61 (L) 70 - 99 mg/dL    BUN 19 6 - 20 mg/dL    CREATININE 1.62 (H) 0.70 - 1.20 mg/dL    Bun/Cre Ratio NOT REPORTED 9 - 20    Calcium 8.6 8.6 - 10.4 mg/dL    Sodium 137 135 - 144 mmol/L    Potassium 5.6 (H) 3.7 - 5.3 mmol/L    Chloride 104 98 - 107 mmol/L    CO2 20 20 - 31 mmol/L    Anion Gap 13 9 - 17 mmol/L    Alkaline Phosphatase 102 40 - 129 U/L    ALT 19 5 - 41 U/L    AST 46 (H) <40 U/L    Total Bilirubin 0.68 0.3 - 1.2 mg/dL    Total Protein 7.7 6.4 - 8.3 g/dL    Albumin 3.9 3.5 - 5.2 g/dL    Albumin/Globulin Ratio 1.0 1.0 - 2.5    GFR Non- 44 (L) >60 mL/min    GFR  54 (L) >60 mL/min    GFR Comment          GFR Staging NOT REPORTED    Troponin   Result Value Ref Range    Troponin, High Sensitivity 65 (HH) 0 - 22 ng/L    Troponin T NOT REPORTED <0.03 ng/mL    Troponin Interp NOT REPORTED    Brain Natriuretic Peptide   Result Value Ref Range    Pro-BNP 64,827 (H) <300 pg/mL    BNP Interpretation Pro-BNP Reference Range:        - LAB IMPRESSION:   Decreased H/H (stable from May/June admission)  Elevated trop at 65 (neg on 3 Nell)  BNP 74,697 (2,665 on 3 Nell)  Elevated Cr  Elevated K    RADIOLOGY:  XR CHEST (2 VW)    Result Date: 7/11/2021  EXAMINATION: TWO XRAY VIEWS OF THE CHEST 7/11/2021 11:44 pm COMPARISON: 01/11/2016 HISTORY: ORDERING SYSTEM PROVIDED HISTORY: known CHF and COPD w/SOB, treated for PNA end of May TECHNOLOGIST PROVIDED HISTORY: known CHF and COPD w/SOB, treated for PNA end of May Reason for Exam: chf Acuity: Unknown Type of Exam: Unknown FINDINGS: The heart is mildly enlarged and more prominent. The pulmonary vessels are engorged centrally more prominent. There are hazy bibasilar opacities which is increased with postop changes along the mediastinum and sternum. There is aortic stent in place along the arch and extending inferiorly along the descending thoracic aorta which is more apparent. No pneumothorax is seen. There are small pleural effusions posteriorly along the lung bases.   There is a minimally displaced fracture of the 7th rib laterally on the left which is more apparent. Status post CABG surgery with mild cardiomegaly and mild central pulmonary congestion which is more prominent Mild bibasilar atelectasis or small infiltrates and small bibasilar pleural effusions posteriorly which is more prominent. Minimally displaced fracture of the 7th rib posterolaterally on the left. EKG:  EKG: sinus tachycardia, ST depression in V6, unchanged from previous tracings. All EKG's are interpreted by the Emergency Department Physician who either signs or Co-signs this chart in the absence of a cardiologist.    CONSULTS:  [facility]  IP CONSULT TO HEART FAILURE NURSE/COORDINATOR  IP CONSULT TO DIETITIAN  IP CONSULT TO CARDIOLOGY    EMERGENCY DEPARTMENT COURSE:  ED Course as of Jul 12 0046   Sun Jul 11, 2021   2221 Eval'd pt and more clear hx      [SM]   2356 Requested something to eat after x-ray, feels better with more blankets    []   Mon Jul 12, 2021   0005 BP elevated, now nauseated, elevated Trop - call Cards for NSTEMI and recs    [SM]   0025 Spoke with intermed and cards, will admit and send EKGs on perfect serve    [SM]   0038 Pro-BNP(!): 64,827 [SM]   0038 Access in rt EJ    [SM]   0042 EKGs forwarded to Cards, repeat unchanged    [SM]      ED Course User Index  [SM] Nick Romero MD       FINAL IMPRESSION      1. Acute on chronic diastolic congestive heart failure ([facility] [address]    2. NSTEMI (non-ST elevated myocardial infarction) ([facility] [address]          DISPOSITION / PLAN     DISPOSITION    Admit to IM with Cards consult    PATIENT REFERRED TO:  No follow-up provider specified.     DISCHARGE MEDICATIONS:  New Prescriptions    No medications on file       Rafael Roland MD  Emergency Medicine Resident    (Please note that portions of thisnote were completed with a voice recognition program.  Efforts were made to edit the dictations but occasionally words are mis-transcribed.)     Sumi Magallon MD  Resident  07/12/21 5903

## 2021-07-12 NOTE — PROGRESS NOTES
Nutrition Education    · Cardiac diet ed not appropriate at this time d/t current medical conditions and d/t pt having standardized/pre-made meals at detention.     Electronically signed by Gia Martins RD, MELLY on 7/12/21 at 10:42 AM EDT    Contact: [phone]

## 2021-07-12 NOTE — PROGRESS NOTES
I was assigned to this patient in error. I did not evaluate or treat this patient during this emergency department encounter.     Juma Anderson DO, PGY-2  Emergency Medicine Resident  7/11/2021     10:53 PM

## 2021-07-12 NOTE — PROGRESS NOTES
Asked charge nurse if any type of restraints were needed to be charted due to patient being held by police at this time at bedside with handcuff. Waiting on response    Restraints noted in daily cares.

## 2021-07-12 NOTE — PROGRESS NOTES
Patient requesting his suboxone. Called [facility] outpt drug detox clinic and left msg. Awaiting return call at this time. Informed pt awaiting reply.

## 2021-07-12 NOTE — ED NOTES
Bed: 47PED  Expected date:   Expected time:   Means of arrival:   Comments:  [address], RN  07/11/21 6915

## 2021-07-12 NOTE — ED NOTES
Writer attempted IV twice without success. Pt is an IV drug user and states they usually have to use his jugular. Resident made aware.       Rocio Jacobson RN  07/11/21 3845

## 2021-07-12 NOTE — ED NOTES
Pt updated on POC and bed status. Pt also updated on suboxone not being able to be verified until his pharmacy opens at 8am. Pt understands. Pt A&Ox4.      Nick Ta RN  07/12/21 6572

## 2021-07-12 NOTE — ED NOTES
Pt continously calling out over being upset he does not have a bed and has been here for over 12 hours. Pt reminded he has been here for 9 hours and the hospital is full. Pt wants to be discharged and go to the [facility]. Pt is known to sign out AMA. Writer will make oncoming nurse aware due to perfect serve not working currently.      Braden Pisano RN  07/12/21 3602

## 2021-07-13 ENCOUNTER — APPOINTMENT (OUTPATIENT)
Dept: GENERAL RADIOLOGY | Age: 56
DRG: 291 | End: 2021-07-13
Payer: COMMERCIAL

## 2021-07-13 VITALS
WEIGHT: 199.96 LBS | RESPIRATION RATE: 12 BRPM | DIASTOLIC BLOOD PRESSURE: 72 MMHG | TEMPERATURE: 98.4 F | BODY MASS INDEX: 26.5 KG/M2 | SYSTOLIC BLOOD PRESSURE: 158 MMHG | HEART RATE: 83 BPM | HEIGHT: 73 IN | OXYGEN SATURATION: 96 %

## 2021-07-13 PROBLEM — E83.42 HYPOMAGNESEMIA: Status: ACTIVE | Noted: 2021-07-13

## 2021-07-13 PROBLEM — D50.9 HYPOCHROMIC ANEMIA: Status: ACTIVE | Noted: 2021-07-13

## 2021-07-13 LAB
ANION GAP SERPL CALCULATED.3IONS-SCNC: 10 MMOL/L (ref 9–17)
BNP INTERPRETATION: ABNORMAL
BUN BLDV-MCNC: 25 MG/DL (ref 6–20)
BUN/CREAT BLD: ABNORMAL (ref 9–20)
CALCIUM SERPL-MCNC: 8.3 MG/DL (ref 8.6–10.4)
CHLORIDE BLD-SCNC: 103 MMOL/L (ref 98–107)
CO2: 24 MMOL/L (ref 20–31)
CREAT SERPL-MCNC: 1.61 MG/DL (ref 0.7–1.2)
EKG ATRIAL RATE: 104 BPM
EKG ATRIAL RATE: 77 BPM
EKG P AXIS: -3 DEGREES
EKG P AXIS: 31 DEGREES
EKG P-R INTERVAL: 138 MS
EKG P-R INTERVAL: 174 MS
EKG Q-T INTERVAL: 356 MS
EKG Q-T INTERVAL: 416 MS
EKG QRS DURATION: 98 MS
EKG QRS DURATION: 98 MS
EKG QTC CALCULATION (BAZETT): 468 MS
EKG QTC CALCULATION (BAZETT): 470 MS
EKG R AXIS: 3 DEGREES
EKG R AXIS: 47 DEGREES
EKG T AXIS: -148 DEGREES
EKG T AXIS: 180 DEGREES
EKG VENTRICULAR RATE: 104 BPM
EKG VENTRICULAR RATE: 77 BPM
GFR AFRICAN AMERICAN: 54 ML/MIN
GFR NON-AFRICAN AMERICAN: 45 ML/MIN
GFR SERPL CREATININE-BSD FRML MDRD: ABNORMAL ML/MIN/{1.73_M2}
GFR SERPL CREATININE-BSD FRML MDRD: ABNORMAL ML/MIN/{1.73_M2}
GLUCOSE BLD-MCNC: 106 MG/DL (ref 70–99)
HCT VFR BLD CALC: 31.4 % (ref 40.7–50.3)
HEMOGLOBIN: 9.2 G/DL (ref 13–17)
LV EF: 28 %
LVEF MODALITY: NORMAL
MAGNESIUM: 1.5 MG/DL (ref 1.6–2.6)
MCH RBC QN AUTO: 24.6 PG (ref 25.2–33.5)
MCHC RBC AUTO-ENTMCNC: 29.3 G/DL (ref 28.4–34.8)
MCV RBC AUTO: 84 FL (ref 82.6–102.9)
NRBC AUTOMATED: 0 PER 100 WBC
PDW BLD-RTO: 17.2 % (ref 11.8–14.4)
PLATELET # BLD: 322 K/UL (ref 138–453)
PMV BLD AUTO: 11.3 FL (ref 8.1–13.5)
POTASSIUM SERPL-SCNC: 4.6 MMOL/L (ref 3.7–5.3)
PRO-BNP: ABNORMAL PG/ML
RBC # BLD: 3.74 M/UL (ref 4.21–5.77)
SODIUM BLD-SCNC: 137 MMOL/L (ref 135–144)
TSH SERPL DL<=0.05 MIU/L-ACNC: 1.51 MIU/L (ref 0.3–5)
WBC # BLD: 9.7 K/UL (ref 3.5–11.3)

## 2021-07-13 PROCEDURE — 83735 ASSAY OF MAGNESIUM: CPT

## 2021-07-13 PROCEDURE — 83880 ASSAY OF NATRIURETIC PEPTIDE: CPT

## 2021-07-13 PROCEDURE — 99233 SBSQ HOSP IP/OBS HIGH 50: CPT | Performed by: INTERNAL MEDICINE

## 2021-07-13 PROCEDURE — 93306 TTE W/DOPPLER COMPLETE: CPT

## 2021-07-13 PROCEDURE — 85027 COMPLETE CBC AUTOMATED: CPT

## 2021-07-13 PROCEDURE — 6370000000 HC RX 637 (ALT 250 FOR IP): Performed by: NURSE PRACTITIONER

## 2021-07-13 PROCEDURE — 93356 MYOCRD STRAIN IMG SPCKL TRCK: CPT

## 2021-07-13 PROCEDURE — 2580000003 HC RX 258: Performed by: NURSE PRACTITIONER

## 2021-07-13 PROCEDURE — 80048 BASIC METABOLIC PNL TOTAL CA: CPT

## 2021-07-13 PROCEDURE — 6360000002 HC RX W HCPCS: Performed by: NURSE PRACTITIONER

## 2021-07-13 PROCEDURE — 84443 ASSAY THYROID STIM HORMONE: CPT

## 2021-07-13 PROCEDURE — 6370000000 HC RX 637 (ALT 250 FOR IP): Performed by: INTERNAL MEDICINE

## 2021-07-13 PROCEDURE — 36415 COLL VENOUS BLD VENIPUNCTURE: CPT

## 2021-07-13 PROCEDURE — 6370000000 HC RX 637 (ALT 250 FOR IP): Performed by: STUDENT IN AN ORGANIZED HEALTH CARE EDUCATION/TRAINING PROGRAM

## 2021-07-13 PROCEDURE — 71046 X-RAY EXAM CHEST 2 VIEWS: CPT

## 2021-07-13 RX ORDER — CARVEDILOL 25 MG/1
25 TABLET ORAL 2 TIMES DAILY WITH MEALS
Status: DISCONTINUED | OUTPATIENT
Start: 2021-07-13 | End: 2021-07-13 | Stop reason: HOSPADM

## 2021-07-13 RX ORDER — FUROSEMIDE 40 MG/1
40 TABLET ORAL DAILY
Status: DISCONTINUED | OUTPATIENT
Start: 2021-07-14 | End: 2021-07-13 | Stop reason: HOSPADM

## 2021-07-13 RX ADMIN — FUROSEMIDE 40 MG: 10 INJECTION, SOLUTION INTRAMUSCULAR; INTRAVENOUS at 08:20

## 2021-07-13 RX ADMIN — PANTOPRAZOLE SODIUM 40 MG: 40 TABLET, DELAYED RELEASE ORAL at 08:20

## 2021-07-13 RX ADMIN — ENOXAPARIN SODIUM 90 MG: 100 INJECTION SUBCUTANEOUS at 00:44

## 2021-07-13 RX ADMIN — SODIUM CHLORIDE, PRESERVATIVE FREE 10 ML: 5 INJECTION INTRAVENOUS at 08:21

## 2021-07-13 RX ADMIN — CARVEDILOL 12.5 MG: 12.5 TABLET, FILM COATED ORAL at 08:20

## 2021-07-13 RX ADMIN — SODIUM CHLORIDE, PRESERVATIVE FREE 10 ML: 5 INJECTION INTRAVENOUS at 00:44

## 2021-07-13 RX ADMIN — AMLODIPINE BESYLATE 10 MG: 10 TABLET ORAL at 08:20

## 2021-07-13 RX ADMIN — ENOXAPARIN SODIUM 90 MG: 100 INJECTION SUBCUTANEOUS at 08:20

## 2021-07-13 RX ADMIN — LORAZEPAM 0.5 MG: 0.5 TABLET ORAL at 08:46

## 2021-07-13 RX ADMIN — LORAZEPAM 0.5 MG: 0.5 TABLET ORAL at 00:44

## 2021-07-13 RX ADMIN — Medication 81 MG: at 08:20

## 2021-07-13 RX ADMIN — DESMOPRESSIN ACETATE 40 MG: 0.2 TABLET ORAL at 00:44

## 2021-07-13 ASSESSMENT — ENCOUNTER SYMPTOMS
COUGH: 0
NAUSEA: 0
VOMITING: 0
SHORTNESS OF BREATH: 1
ABDOMINAL PAIN: 0

## 2021-07-13 ASSESSMENT — PAIN SCALES - GENERAL
PAINLEVEL_OUTOF10: 0
PAINLEVEL_OUTOF10: 8

## 2021-07-13 NOTE — PROGRESS NOTES
[facility]   Progress Note                   Date:   7/13/2021  Patient name: Jose Hanna  Date of admission:  7/11/2021 10:00 PM  MRN:   [id number]  YOB: 1965  PCP: No primary care provider on file. Reason for Admission: Decompensated heart failure ([facility] [address] [I50.9]    Subjective:       Clinical Changes / Abnormalities:Patient seen and examined at the bed side. No new acute events overnight. Patient is doing well, has no complaints. Denies chest pain or SOB. Patient states that he has to leave the hospital today. He states that his mothers house collapsed and he is needed to help manage the clean up. Anxious to leave. Echo done. Results remain pending. Reviewed vitals, labs, tele, & previous testing. Medications:   Scheduled Meds:   [Held by provider] lisinopril  10 mg Oral Daily    pantoprazole  40 mg Oral Daily    sodium chloride flush  5-40 mL Intravenous 2 times per day    enoxaparin  1 mg/kg Subcutaneous BID    furosemide  40 mg Intravenous BID    lidocaine  2 patch Transdermal Daily    aspirin  81 mg Oral Daily    atorvastatin  40 mg Oral Nightly    carvedilol  12.5 mg Oral BID WC    amLODIPine  10 mg Oral Daily    nicotine  1 patch Transdermal Daily     Continuous Infusions:   nitroGLYCERIN 5 mcg/min (07/12/21 0115)    sodium chloride       CBC:   Recent Labs     07/11/21 2306 07/13/21  0700   WBC 8.2 9.7   HGB 10.9* 9.2*    322     BMP:    Recent Labs     07/11/21 2306 07/12/21  0341 07/13/21  0441    140 137   K 5.6* 4.3 4.6    108* 103   CO2 20 21 24   BUN 19 20 25*   CREATININE 1.62* 1.55* 1.61*   GLUCOSE 61* 127* 106*     Hepatic:   Recent Labs     07/11/21 2306   AST 46*   ALT 19   BILITOT 0.68   ALKPHOS 102     Troponin:   Recent Labs     07/11/21 2306 07/12/21  0341   TROPHS 65* 57*     BNP: No results for input(s): BNP in the last 72 hours. Lipids: No results for input(s): CHOL, HDL in the last 72 hours.     Invalid input(s): LDLCALCU  INR: No results for input(s): INR in the last 72 hours. Objective:   Vitals: BP (!) 144/72   Pulse 82   Temp 98.4 °F (36.9 °C) (Temporal)   Resp 17   Ht 6' 1\" (1.854 m)   Wt 199 lb 15.3 oz (90.7 kg)   SpO2 97%   BMI 26.38 kg/m²   General appearance: alert and cooperative with exam  HEENT: Head: Normocephalic, no lesions, without obvious abnormality. Neck: no JVD, trachea midline, no adenopathy  Lungs: Clear to auscultation. Room air without distress. Heart: Regular rate and rhythm, s1/s2 auscultated, no murmurs. SR on tele. Abdomen: soft, non-tender, bowel sounds active  Extremities: no edema  Neurologic: not done    ECHO 5/21/21 at [address] [place] [address]  Left Ventricle: Systolic function is low normal to mildly decreased   with an ejection fraction of 50-55%. Normal diastolic function is present. Lateral E' is 13.4 cm/s. Medial E' is 6.7 cm/s.   Aortic Valve: There is no regurgitation or stenosis.   Aorta: The aortic root is mildly dilated.   Pericardium: There is no pericardial effusion. There is a left pleural   effusion. Assessment / Acute Cardiac Problems:   1. Acute on chronic HFpEF, likely due to #5 & #6  2. H/O type B aortic dissection s/p TEVAR with stents to renal artery and SMA, later developed type aortic dissection with AR s/p repair  3. [place] on CKD  4. Elevated troponin likely from supply demand mismatch due to #1 and #3.  64 down trended to 57  5. Hypertensive urgency  6. Medication noncompliance  7. Extensive history of polysubstance abuse  8. Smoker  9.  Iron deficiency anemia    Patient Active Problem List:     Narcotic overdose ([facility] [address]     Uncontrolled hypertension     Hyperglycemia     Tobacco abuse     Hypokalemia     Decompensated heart failure (HCC)     Stage 3a chronic kidney disease ([facility] [address]     Essential hypertension     Substance addiction ([facility] [address]     History of chronic pancreatitis     Acute on chronic systolic congestive heart failure ([address]     Rib pain on left side      Plan of Treatment:   1. Elevated troponin likely secondary due to CHF and [place]/CKD  2. Acute on ChronicHF. Negative 4L since admission. No overt signs of volume overload. Change Lasix to PO today. .  3. [place] on CKD. Creatinine 1.61  Continue to hold lisinopril and aldactone due to [place]. May consider nephrology consult to help manage. 4. HTN. BP remains elevated. Continue Norvasc 10mg daily. Increase Coreg to 25mg BID  5. Echo done. Results pending. Further orders pending echo findings  6. Patient insistent on leaving hospital today. Encouraged to wait for echo findings and explained he may need further cardiac evaluation based on findings. 7. If no reduction in LVEF, WMA or valvular irregularities on echo. No objection to discharge later today. Recommend follow up with cardiology in 2 weeks.            Electronically signed by MAMI Del Cid CNP on 7/13/2021 at 11:35 AM  [facility]      [phone]

## 2021-07-13 NOTE — CARE COORDINATION
Case Management Initial Discharge Plan  Lisa Michel,             Met with:patient to discuss discharge plans. Information verified: address, contacts, phone number, , insurance Yes  Insurance Provider: Richa    Emergency Contact/Next of Kin name & number: wife Manjula  Who are involved in patient's support system? Wife and mother    PCP: No primary care provider on file. Discharge Planning    Living Arrangements:  Spouse/Significant Other, Parent     Home has one story    Patient able to perform ADL's:Independent    Current Services (outpatient & in home) none  DME equipment: none  DME provider: n/a    Is patient receiving oral anticoagulation therapy? No          Potential Assistance Needed:  N/A    Patient agreeable to home care: No  [place] of choice provided:  n/a    Prior SNF/Rehab Placement and Facility: N/A  Agreeable to SNF/Rehab: No  [place] of choice provided: n/a     Evaluation: n/a    Expected Discharge date:  21    Patient expects to be discharged to:  home    If home: is the family and/or caregiver willing & able to provide support at home? yes  Who will be providing this support? mother    Follow Up Appointment: Best Day/ Time:      Transportation provider: mother  Transportation arrangements needed for discharge: No    Readmission Risk              Risk of Unplanned Readmission:  18             Does patient have a readmission risk score greater than 14?: Yes  If yes, follow-up appointment must be made within 7 days of discharge.      Goals of Care: breathe easier      Educated patient on transitional options, provided freedom of choice and are agreeable with plan      Discharge Plan: home with family    Uvaldo Madrigal          Electronically signed by Ani Garcia RN on 21 at 11:37 AM EDT

## 2021-07-13 NOTE — PROGRESS NOTES
Patient wanting to go home after echo, did not want to stay after procedure. Spoke to him about the dangers of leaving AMA. Pt asked for his IV to be removed and left AMA @ 1207. DO Rudolph notified.

## 2021-07-13 NOTE — ED PROVIDER NOTES
[address]   Emergency Department  Emergency Medicine Attending Sign-out     Care of Sandra Gresham was assumed from previous attending Dr. Maureen Rod and is being seen for Drug Overdose (needs med cleared for long-term)  . The patient's initial evaluation and plan have been discussed with the prior provider who initially evaluated the patient. Attestation  I was available and discussed any additional care issues that arose and coordinated the management plans with the resident(s) caring for the patient during my duty period. Any areas of disagreement with resident's documentation of care or procedures are noted on the chart. I was personally present for the key portions of any/all procedures, during my duty period. I have documented in the chart those procedures where I was not present during the key portions. BRIEF PATIENT SUMMARY/MDM COURSE PER INITIAL PROVIDER:   RECENT VITALS:     Temp: 98.4 °F (36.9 °C),  Pulse: 83, Resp: 12, BP: (!) 158/72, SpO2: 96 %    This patient is a 55 y.o. Male with recent cardiac arrest and EF of 30% and had left AMA. Returned with Chest pain, SOB, and signs of heart failure. Admit and cardiology consult       OUTSTANDING TASKS / ADDITIONAL COMMENTS   1.  Michoacano Pires MD  Emergency Medicine Attending  [id number]erg Craig MD  07/13/21 2101

## 2021-07-13 NOTE — PLAN OF CARE
Problem: OXYGENATION/RESPIRATORY FUNCTION  Goal: Patient will maintain patent airway  Description: Patient will maintain patent airway  Outcome: Ongoing  Goal: Patient will achieve/maintain normal respiratory rate/effort  Description: Respiratory rate and effort will be within normal limits for the patient  Outcome: Ongoing     Problem: HEMODYNAMIC STATUS  Goal: Patient has stable vital signs and fluid balance  Outcome: Ongoing     Problem: FLUID AND ELECTROLYTE IMBALANCE  Goal: Fluid and electrolyte balance are achieved/maintained  Outcome: Ongoing     Problem: ACTIVITY INTOLERANCE/IMPAIRED MOBILITY  Goal: Mobility/activity is maintained at optimum level for patient  Outcome: Ongoing

## 2021-07-14 NOTE — PROGRESS NOTES
[facility]  Office: [address], DO, Quita Modisar, DO, Elias Narrow, DO, Rj Alvarez Blood, DO, Juani Douglas MD, Luis Davalos MD, Geo Kauffman MD, Cathy Brantley MD, Leigh Doshi MD, Soco Gutierrez MD, Orion Cadena MD, Stacie Montoya MD, Marc Hicks, DO, Rodrigo Titus MD, Mukesh Vegas, DO, Gaston Mckenzie MD,  Chantelle Novoa, DO, Maude Logan MD, Dinh Rivera MD, Fred Gibbs MD, Chiquita Ritter MD, Thanh Kim, Valentin Mai, CNP, Ebenezer Navarro, CNP, Laura Councilman, CNS, Claudia Rizzo, CNP, John Samuel, CNP, Kizzy Freitas, CNP, Emy Munson, CNP, Monika Sidhu, CNP, Damien Villatoro PA-C, Yadi Mccullough, [facility], Emile Moise, CNP, Mihaela Child, CNP, Marian Starch, CNP, Shrada Copper, CNP, Demetra Renée, CNP, Keo Randall, [facility]    Progress Note    7/13/2021    10:34 PM    Name:   Meghan Echevarria  MRN:     [id number]     Acct:      [de-identified]   Room:   2009/2009-01  IP Day:  1  Admit Date:  7/11/2021 10:00 PM    PCP:   No primary care provider on file. Code Status:  Prior    Subjective:     C/C:   Chief Complaint   Patient presents with    Drug Overdose     needs med cleared for skilled nursing       Interval History Status:  Much improved  Up to chair  Less short of breath  Insisting on going home  He reports that his mother's roof has caved in allowing water to come into the house    Data Base Updates:  [id number]     [address] 74Low m/uL Hemoglobin9.2Low     Magnesium1.5Low     Follow-up EKG:  Narrative:  Normal sinus rhythm   Left ventricular hypertrophy with repolarization abnormality   Abnormal ECG   When compared with ECG of 12-JUL-2021 00:35,   Premature atrial complexes are no longer Present   T wave inversion now evident in Anterolateral leads     BP remains rather labile    Brief History:     As documented in the medical record:  \"[place] is a 55 y. o. Non-/non  male diastolic chronic congestive heart failure, persistent pleural effusion on the right, history of aspiration pneumonia, chronic kidney disease stage 3, history of aortic dissection status post TAVR and endovascular graft placement, COPD, hepatitis-C, hypertension, extensive history of polysubstance abuse including heroin cocaine and opioids, tobacco abuse and medications noncompliance  who presents with  and is admitted to the hospital for the management of AonC exacerbation of systolic heart failure    Patient was brought in to the emergency room under police custody for the concern of ankle swelling and shortness of breath. Patient states he has been suffering for \" months\" with this issue and felt like he needed evaluated. He does not really elaborate on a time frame of acute worsening of the symptoms. Patient has had multiple admissions in which he has signed AMA. Patient admits to 20 drug overdose as well. Initial plan included:  1. IV diuresis, obtain echocardiogram, monitor I/o daily weight, on lisinopril, poly substance abuse, addition of bb at the decision of cardiology given the risk vs benefits. 2. Monitor renal function, avoid nephrotoxins, could be related secondary to the heart failure. 3. Monitor blood pressures, continue with lisinopril when potassium is normalized and kidney function has improved to baseline. Creat was normal in June at [facility]   4. Toradol and Lidoderm for the rib pain. 5. Patient states he is on suboxone, I do not see any fills of this medication on the [facility], I have even called pharmacy to verify this has not been dispensed, it is on the d/c summary of last hospitlal stay, but he signed AMA. Will  Need to get the name of the pharmacy  He filled it at and confirm  6. Continue to trend the troponin. Could be leaking secondary to the HF exacerbation and with the elevated kidney function   7. GI proph  8.  DVT proph\" Echo:Summary   Left ventricle is normal in size. Global left ventricular systolic function   is severely reduced. Estimated ejection fraction is 25-30 % . Calculated EF via 3D Heart Model is 28 %. Abnormal global L. strain of -7.7 %, possible apical sparing pattern, maybe   suggestive of infiltrative CM. Moderate left ventricular hypertrophy. Grade I (mild) left ventricular diastolic dysfunction. Left atrium is moderately dilated. Right atrial dilatation. Mildly dilated right ventricular cavity. Right ventricular function appears   normal .   Aortic valve is sclerotic but opens well. Trivial aortic insufficiency. Thickened mitral valve leaflets. Mild mitral regurgitation. Trivial tricuspid regurgitation. Trivial pulmonic insufficiency. Aortic root is moderately (4.6cm) dilated. The ascending aorta is normal in   size. On 7/13 the patient requested to leave the hospital AMA      Medications: Allergies: Allergies   Allergen Reactions    Codeine Itching    Morphine Itching       Current Meds:   Scheduled Meds:   Continuous Infusions:   PRN Meds:     Data:     Past Medical History:   has a past medical history of Hep C w/o coma, chronic ([address] Hypertension, MI (mitral incompetence), and Pancreatitis. Social History:   reports that he has been smoking cigarettes. He has been smoking about 1.00 pack per day. He has never used smokeless tobacco. He reports current drug use. Drugs: Marijuana, Opiates , and Methamphetamines. He reports that he does not drink alcohol. Family History:   Family History   Problem Relation Age of Onset    Other Father         suicide       Review of Systems:     Review of Systems   Constitutional: Positive for activity change (Improved). Respiratory: Positive for shortness of breath (Less dyspnea on exertion). Negative for cough. Cardiovascular: Positive for leg swelling (Improving). Negative for chest pain and palpitations.    Gastrointestinal: Negative for abdominal pain, nausea and vomiting. Genitourinary: Negative for flank pain and hematuria. Psychiatric/Behavioral: The patient is nervous/anxious. Physical Examination:        Physical Exam  Vitals reviewed. Constitutional:       General: He is not in acute distress. Appearance: He is not diaphoretic. HENT:      Head: Normocephalic. Nose: Nose normal.   Eyes:      General: No scleral icterus. Conjunctiva/sclera: Conjunctivae normal.   Neck:      Trachea: No tracheal deviation. Cardiovascular:      Rate and Rhythm: Normal rate and regular rhythm. Pulmonary:      Effort: Pulmonary effort is normal. No respiratory distress. Breath sounds: Normal breath sounds. No wheezing or rales. Chest:      Chest wall: No tenderness. Abdominal:      General: Bowel sounds are normal. There is no distension. Palpations: Abdomen is soft. Tenderness: There is no abdominal tenderness. Musculoskeletal:         General: No tenderness. Cervical back: Neck supple. Skin:     General: Skin is warm and dry. Neurological:      Mental Status: He is alert and oriented to person, place, and time. Psychiatric:      Comments: Somewhat anxious         Vitals:  BP (!) 158/72   Pulse 83   Temp 98.4 °F (36.9 °C) (Temporal)   Resp 12   Ht 6' 1\" (1.854 m)   Wt 199 lb 15.3 oz (90.7 kg)   SpO2 96%   BMI 26.38 kg/m²   Temp (24hrs), Av.4 °F (36.9 °C), Min:98.4 °F (36.9 °C), Max:98.5 °F (36.9 °C)    No results for input(s): POCGLU in the last 72 hours. I/O (24Hr):     Intake/Output Summary (Last 24 hours) at 2021 2234  Last data filed at 2021 0848  Gross per 24 hour   Intake 240 ml   Output 3125 ml   Net -2885 ml       Labs:  Hematology:  Recent Labs     21  2306 21  0700   WBC 8.2 9.7   RBC 4.57 3.74*   HGB 10.9* 9.2*   HCT 37.8* 31.4*   MCV 82.7 84.0   MCH 23.9* 24.6*   MCHC 28.8 29.3   RDW 17.2* 17.2*    322   MPV 10.0 11.3 Chemistry:  Recent Labs     07/11/21  2306 07/12/21  0341 07/13/21  0441    140 137   K 5.6* 4.3 4.6    108* 103   CO2 20 21 24   GLUCOSE 61* 127* 106*   BUN 19 20 25*   CREATININE 1.62* 1.55* 1.61*   MG  --   --  1.5*   ANIONGAP 13 11 10   LABGLOM 44* 47* 45*   GFRAA 54* 57* 54*   CALCIUM 8.6 7.9* 8.3*   PROBNP 64,827*  --  56,406*   TROPHS 65* 57*  --    MYOGLOBIN  --  24*  --      Recent Labs     07/11/21  2306 07/13/21  0441   PROT 7.7  --    LABALBU 3.9  --    TSH  --  1.51   AST 46*  --    ALT 19  --    ALKPHOS 102  --    BILITOT 0.68  --      ABG:No results found for: POCPH, PHART, PH, POCPCO2, FZP2YRA, PCO2, POCPO2, PO2ART, PO2, POCHCO3, PTD9OSD, HCO3, NBEA, PBEA, BEART, BE, THGBART, THB, PMX8HRT, CUEK7QUI, G4JZFJJA, O2SAT, FIO2  No results found for: SPECIAL  No results found for: CULTURE    Radiology:  XR CHEST (2 VW)    Result Date: 7/13/2021  Decreased pulmonary size and normalized pulmonary vascularity. Mild bilateral pleural effusions with some suspected partial atelectasis within the lower lobes bilaterally. XR CHEST (2 VW)    Result Date: 7/11/2021  Status post CABG surgery with mild cardiomegaly and mild central pulmonary congestion which is more prominent Mild bibasilar atelectasis or small infiltrates and small bibasilar pleural effusions posteriorly which is more prominent. Minimally displaced fracture of the 7th rib posterolaterally on the left. Assessment:        Principal Problem:    Acute on chronic systolic congestive heart failure (HCC)  Active Problems:    Uncontrolled hypertension    Tobacco abuse    Decompensated heart failure (HCC)    Stage 3a chronic kidney disease (HCC)    Essential hypertension    Substance addiction ([address]    Rib pain on left side    Hypochromic anemia    Hypomagnesemia    Acute on chronic diastolic congestive heart failure ([address]  Resolved Problems:    * No resolved hospital problems.  *      Plan:        Optimize cardio-pulmonary function Blood Pressure - Monitor and control   Check bun and creatinine   Correct electrolyte abnormalities   Respiratory Therapy and Bronchodilators prn   The patient elected to leave the hospital AMA  The patient was instructed to follow up with their PCP,  in one week  He reportedly has been [facility] from [facility]  Anemia w/u on outpatient basis is suggested    Smoking cessation / education     IP CONSULT TO CARDIOLOGY  IP CONSULT TO INTERNAL MEDICINE  IP CONSULT TO HEART FAILURE NURSE/COORDINATOR  IP CONSULT TO DIETITIAN  IP CONSULT TO DO Abbie  7/13/2021  10:34 PM

## 2021-07-14 NOTE — DISCHARGE SUMMARY
[facility]  Office: [address], DO, Katarina King, DO, Angeles Morel, DO, Nannette Carter, DO, Wisam Garcia MD, Yazmin Pressley MD, Rahul Maynard MD, Ashish Valerio MD, Fco Craven MD, Jakub Anthony MD, Kristina Singh MD, Aleyda Murray MD, Janet Munoz DO, Hodan Mayer MD, Brian Dhillon DO, Sue Raman MD,  Shanel Rudolph DO, Jose Davila MD, Clay Melo MD, Solomon Childress MD, Tami Yi MD, Prakash Rodriguez [facility], [facility], CNP, Allen Gann, CNP, Patrick Sumner, CNS, Irene Lackey, CNP, Anyi Mosqueda, CNP, Erma Haddad, CNP, Elana Swain, [facility], Isabella Lazo, CNP, Venecia Montero PA-C, Alexi Rodrigez, [facility], Marisol Tristan, CNP, Brooke Robbins, CNP, Jeffry Kimble, CNP, Chris Renteria, CNP, Leroy Torres, CNP, Magnolia Villarreal, [address] [place] [address]    Discharge Summary    Patient ID: Yonny Coronado  :  1965   MRN: [id number]     ACCOUNT:  [de-identified]   Patient's PCP: No primary care provider on file. Admit Date: 2021   Discharge Date: 2021    Discharge Physician: Enio Durand DO     The patient was seen and examined on day of discharge and this discharge summary is in conjunction with any daily progress note from day of discharge.     Active Discharge Diagnoses:     Primary Problem  Acute on chronic systolic /diastolic congestive heart failure [facility] Problems    Diagnosis Date Noted    Hypochromic anemia [D50.9] 2021    Hypomagnesemia [E83.42] 2021    Acute on chronic diastolic congestive heart failure (HCC) [I50.33]     Decompensated heart failure ([facility] [I50.9] 2021    Rib pain on left side [R07.81] 2021    Stage 3a chronic kidney disease ([facility] [N18.31] 10/03/2020    Tobacco abuse [Z72.0] 2019    Uncontrolled hypertension [I10] 2019    Essential hypertension [I10] 2017    Acute on chronic systolic congestive heart failure ([facility] [I50.23] 12/31/2017    Substance addiction ([facility] [F19.20] 09/10/2017         Hospital Course:     Brief History:  As documented in the medical record:  \"Rio Croft is a 55 y.o. Non-/non  male diastolic chronic congestive heart failure, persistent pleural effusion on the right, history of aspiration pneumonia, chronic kidney disease stage 3, history of aortic dissection status post TAVR and endovascular graft placement, COPD, hepatitis-C, hypertension, extensive history of polysubstance abuse including heroin cocaine and opioids, tobacco abuse and medications noncompliance  who presents with  and is admitted to the hospital for the management of AonC exacerbation of systolic heart failure    Patient was brought in to the emergency room under police custody for the concern of ankle swelling and shortness of breath.  Patient states he has been suffering for \" months\" with this issue and felt like he needed evaluated. [facility] does not really elaborate on a time frame of acute worsening of the symptoms.    Patient has had multiple admissions in which he has signed Felix Zepeda admits to 20 drug overdose as well.       Initial plan included:  1. IV diuresis, obtain echocardiogram, monitor I/o daily weight, on lisinopril, poly substance abuse, addition of bb at the decision of cardiology given the risk vs benefits. 2. Monitor renal function, avoid nephrotoxins, could be related secondary to the heart failure. 3. Monitor blood pressures, continue with lisinopril when potassium is normalized and kidney function has improved to baseline. Creat was normal in June at [facility]   4.  Toradol and Lidoderm for the rib pain.   5. Patient states he is on suboxone, I do not see any fills of this medication on the [facility]ARRS, I have even called pharmacy to verify this has not been dispensed, it is on the d/c summary of last hospitlal stay, but he signed DELVIN Mcclelland Scriver to get the name of the pharmacy [facility] filled it at and confirm  6. Continue to trend the troponin. Could be leaking secondary to the HF exacerbation and with the elevated kidney function   7. GI proph  8. DVT proph\"      Echo:Summary   Left ventricle is normal in size. Global left ventricular systolic function   is severely reduced. Estimated ejection fraction is 25-30 % . Calculated EF via 3D Heart Model is 28 %. Abnormal global L. strain of -7.7 %, possible apical sparing pattern, maybe   suggestive of infiltrative CM. Moderate left ventricular hypertrophy. Grade I (mild) left ventricular diastolic dysfunction. Left atrium is moderately dilated. Right atrial dilatation. Mildly dilated right ventricular cavity. Right ventricular function appears   normal .   Aortic valve is sclerotic but opens well. Trivial aortic insufficiency. Thickened mitral valve leaflets. Mild mitral regurgitation. Trivial tricuspid regurgitation. Trivial pulmonic insufficiency. Aortic root is moderately (4.6cm) dilated. The ascending aorta is normal in   size.      On 7/13 the patient requested to leave the hospital AMA    Discharge plan:     Optimize cardio-pulmonary function   Blood Pressure - Monitor and control   Check bun and creatinine   Correct electrolyte abnormalities   Respiratory Therapy and Bronchodilators prn   The patient elected to leave the hospital AMA  The patient was instructed to follow up with their PCP,  in one week  He reportedly has been released from [facility]  Anemia w/u on outpatient basis is suggested    Smoking cessation / education   The patient left the hospital AMA    No discharge procedures on file.     Significant Diagnostic Studies:     Labs / Micro:  Labs:  Hematology:  Recent Labs     07/11/21  2306 07/13/21  0700   WBC 8.2 9.7   RBC 4.57 3.74*   HGB 10.9* 9.2*   HCT 37.8* 31.4*   MCV 82.7 84.0   MCH 23.9* 24.6*   MCHC 28.8 29.3   RDW 17.2* 17.2*    322   MPV 10.0 11.3 Chemistry:  Recent Labs     07/11/21 2306 07/12/21  0341 07/13/21  0441    140 137   K 5.6* 4.3 4.6    108* 103   CO2 20 21 24   GLUCOSE 61* 127* 106*   BUN 19 20 25*   CREATININE 1.62* 1.55* 1.61*   MG  --   --  1.5*   ANIONGAP 13 11 10   LABGLOM 44* 47* 45*   GFRAA 54* 57* 54*   CALCIUM 8.6 7.9* 8.3*   PROBNP 64,827*  --  56,406*   TROPHS 65* 57*  --    MYOGLOBIN  --  24*  --      Recent Labs     07/11/21 2306 07/13/21  0441   PROT 7.7  --    LABALBU 3.9  --    TSH  --  1.51   AST 46*  --    ALT 19  --    ALKPHOS 102  --    BILITOT 0.68  --          Radiology:    ECHO Complete 2D W Doppler W Color    Result Date: 7/13/2021  Transthoracic Echocardiography Report (TTE)  Patient Name [facility]    Date of Study           07/13/2021               Bre Chahal   Date of      1965  Gender                  Male  Birth   Age          55 year(s)  Race                       Room Number  2009        Height:                 73 inch, 185.42 cm   Corporate ID [id number]    Weight:                 200 pounds, 90.7 kg  #   Patient Acct [de-identified]   BSA:        2.15 m^2    BMI:         26.39 kg/m^2  #   MR #         [id number]     Sonographer             Diann Patterson   Accession #  [id number]  Interpreting Physician  [address] [place] [address]   Fellow                   Referring Nurse                           Practitioner   Interpreting             Referring Physician     Diane Sofia,  Fellow                                           CNP  Type of Study   TTE procedure:2D Echocardiogram, M-Mode, Doppler, Color Doppler,  Myocardial Strain. Procedure Date Date: 07/13/2021 Start: 10:46 AM Study Location: [facility] Technical Quality: Good visualization Indications:Hypertension and Congestive heart failure. History / Tech. Comments: Procedure explained to patient.  Narcotic overdose, decompensated heart failure, substance addiction Patient Status: Inpatient Height: 73 inches Weight: 200 pounds BSA: 2.15 m^2 BMI: 26.39 kg/m^2 HR: 82 bpm Allergies   - *No Known Allergies. CONCLUSIONS Summary Left ventricle is normal in size. Global left ventricular systolic function is severely reduced. Estimated ejection fraction is 25-30 % . Calculated EF via 3D Heart Model is 28 %. Abnormal global L. strain of -7.7 %, possible apical sparing pattern, maybe suggestive of infiltrative CM. Moderate left ventricular hypertrophy. Grade I (mild) left ventricular diastolic dysfunction. Left atrium is moderately dilated. Right atrial dilatation. Mildly dilated right ventricular cavity. Right ventricular function appears normal . Aortic valve is sclerotic but opens well. Trivial aortic insufficiency. Thickened mitral valve leaflets. Mild mitral regurgitation. Trivial tricuspid regurgitation. Trivial pulmonic insufficiency. Aortic root is moderately (4.6cm) dilated. The ascending aorta is normal in size. Signature ----------------------------------------------------------------------------  Electronically signed by Diann Patterson(Sonographer) on 07/13/2021  01:55 PM ---------------------------------------------------------------------------- ----------------------------------------------------------------------------  Electronically signed by Fransico Early(Interpreting physician) on 07/13/2021  02:30 PM ---------------------------------------------------------------------------- FINDINGS Left Atrium Left atrium is moderately dilated. Left Ventricle Left ventricle is normal in size. Global left ventricular systolic function is severely reduced. Estimated ejection fraction is 25-30 % . Calculated EF via 3D Heart Model is 28 %. Calculated EF via Ferrara's method 31 % with abnormal global L. strain of -7.7 %, possible apical sparing pattern, maybe suggestive of infiltrative CM. Moderate left ventricular hypertrophy. Grade I (mild) left ventricular diastolic dysfunction. Right Atrium Right atrial dilatation.  Right Ventricle Mildly dilated right ventricular cavity. Right ventricular function appears normal . Mitral Valve Thickened mitral valve leaflets. Mild mitral regurgitation. No mitral stenosis. Aortic Valve Aortic valve is sclerotic but opens well. Aortic valve is trileaflet. Trivial aortic insufficiency. No aortic stenosis. Tricuspid Valve Normal tricuspid valve leaflets. Trivial tricuspid regurgitation. No tricuspid stenosis. Insignificant tricuspid regurgitation, unable to estimate RVSP. Pulmonic Valve Pulmonic valve is normal in structure and function. Trivial pulmonic insufficiency. No evidence of pulmonic stenosis. Pericardial Effusion No pericardial effusion. Miscellaneous Aortic root is moderately (4.6cm) dilated. The ascending aorta is normal in size. E/E' average = 10.25. IVC normal diameter & inspiratory collapse indicating normal RA filling pressure .  M-mode / 2D Measurements & Calculations:   LVIDd:5.4 cm(3.7 - 5.6 cm)       Diastolic ASWXJM:807.48 ml  LVIDs:4.3 cm(2.2 - 4.0 cm)       Systolic ZIZTXK:62.3 ml  YBOL:3.2 cm(0.6 - 1.1 cm)        Aortic Root:4.6 cm(2.0 - 3.7 cm)  LVPWd:1.5 cm(0.6 - 1.1 cm)       LA Dimension: 4.3 cm(1.9 - 4.0 cm)  Fractional Shortenin.37 %    LA volume/Index: 87.97 ml /41m^2  Calculated LVEF (%): 34.47 %     LVOT:2.3 cm                                   RVDd:4.3 cm   Mitral:                                 Aortic   Valve Area (P1/2-Time): 2.34 cm^2       Peak Velocity: 0.91 m/s  Peak E-Wave: 0.55 m/s                   Mean Velocity: 0.62 m/s  Peak A-Wave: 0.65 m/s                   Peak Gradient: 3.28 mmHg  E/A Ratio: 0.85                         Mean Gradient: 3 mmHg  Peak Gradient: 1.22 mmHg  Mean Gradient: 1 mmHg  Deceleration Time: 331 msec             Area (continuity): 3.68 cm^2  P1/2t: 94 msec                          AV VTI: 16.6 cm   Area (continuity): 2.95 cm^2  Mean Velocity: 0.56 m/s                                           Pulmonic:                                           Peak Velocity: 0.77 m/s                                          Peak Gradient: 2.4 mmHg  Diastology / Tissue Doppler Septal Wall E' velocity:0.04 m/s Septal Wall E/E':13 Lateral Wall E' velocity:0.07 m/s Lateral Wall E/E':7.5    XR CHEST (2 VW)    Result Date: 7/13/2021  EXAMINATION: TWO XRAY VIEWS OF THE CHEST 7/13/2021 10:16 am COMPARISON: 11 July 2021 HISTORY: ORDERING SYSTEM PROVIDED HISTORY: CHF TECHNOLOGIST PROVIDED HISTORY: CHF FINDINGS: PA and lateral views of the chest are electronically marked regarding sides. Median sternotomy wires and mediastinal clips are present. Aortic endovascular graft is also seen and appears unchanged. Borderline cardiomegaly. Pulmonary vascularity is within normal limits. There is some minimal increased opacity within the medial lower lungs bilaterally. Mild soft tissue density visualized within the costophrenic sulci. Minimal medial migration of the right costophrenic angle. No pneumothoraces on either side. Stable lateral 7th rib fracture. There is also posterior left 8th rib fracture suspected. Decreased pulmonary size and normalized pulmonary vascularity. Mild bilateral pleural effusions with some suspected partial atelectasis within the lower lobes bilaterally. XR CHEST (2 VW)    Result Date: 7/11/2021  EXAMINATION: TWO XRAY VIEWS OF THE CHEST 7/11/2021 11:44 pm COMPARISON: 01/11/2016 HISTORY: ORDERING SYSTEM PROVIDED HISTORY: known CHF and COPD w/SOB, treated for PNA end of May TECHNOLOGIST PROVIDED HISTORY: known CHF and COPD w/SOB, treated for PNA end of May Reason for Exam: chf Acuity: Unknown Type of Exam: Unknown FINDINGS: The heart is mildly enlarged and more prominent. The pulmonary vessels are engorged centrally more prominent. There are hazy bibasilar opacities which is increased with postop changes along the mediastinum and sternum.   There is aortic stent in place along the arch and extending inferiorly along the descending thoracic aorta which is more apparent. No pneumothorax is seen. There are small pleural effusions posteriorly along the lung bases. There is a minimally displaced fracture of the 7th rib laterally on the left which is more apparent. Status post CABG surgery with mild cardiomegaly and mild central pulmonary congestion which is more prominent Mild bibasilar atelectasis or small infiltrates and small bibasilar pleural effusions posteriorly which is more prominent. Minimally displaced fracture of the 7th rib posterolaterally on the left. Consultations:    Consults:     Final Specialist Recommendations/Findings:   IP CONSULT TO CARDIOLOGY  IP CONSULT TO INTERNAL MEDICINE  IP CONSULT TO HEART FAILURE NURSE/COORDINATOR  IP CONSULT TO DIETITIAN  IP CONSULT TO CARDIOLOGY        Discharged Condition:    Stable     Disposition: AMA    Physician Follow Up:   No follow-up provider specified. Activity:  activity as tolerated    Diet:  cardiac diet     Discharge Medications:      Medication List      ASK your doctor about these medications    albuterol sulfate  (90 Base) MCG/ACT inhaler  Commonly known as: Proventil HFA  Inhale 2 puffs into the lungs every 4 hours as needed for Wheezing or Shortness of Breath (Space out to every 6 hours as symptoms improve) Space out to every 6 hours as symptoms improve. lisinopril 10 MG tablet  Commonly known as: PRINIVIL;ZESTRIL     Protonix 40 MG tablet  Generic drug: pantoprazole            Time Spent on discharge is  20 mins in patient examination, evaluation, counseling, medication reconciliation, discharge plan and follow up. Electronically signed by   Bharat Jhonson DO  7/13/2021  10:34 PM      Thank you Dr. Fili Almazan primary care provider on file. for the opportunity to be involved in this patient's care.